# Patient Record
Sex: FEMALE | Race: WHITE | NOT HISPANIC OR LATINO | Employment: FULL TIME | URBAN - METROPOLITAN AREA
[De-identification: names, ages, dates, MRNs, and addresses within clinical notes are randomized per-mention and may not be internally consistent; named-entity substitution may affect disease eponyms.]

---

## 2017-07-28 ENCOUNTER — APPOINTMENT (OUTPATIENT)
Dept: WOMENS IMAGING | Facility: HOSPITAL | Age: 47
End: 2017-07-28

## 2017-07-28 PROCEDURE — 77067 SCR MAMMO BI INCL CAD: CPT | Performed by: RADIOLOGY

## 2017-08-30 ENCOUNTER — APPOINTMENT (OUTPATIENT)
Dept: WOMENS IMAGING | Facility: HOSPITAL | Age: 47
End: 2017-08-30

## 2017-08-30 PROCEDURE — 76641 ULTRASOUND BREAST COMPLETE: CPT | Performed by: RADIOLOGY

## 2017-08-30 PROCEDURE — G0204 DX MAMMO INCL CAD BI: HCPCS | Performed by: RADIOLOGY

## 2017-08-30 PROCEDURE — 77066 DX MAMMO INCL CAD BI: CPT | Performed by: RADIOLOGY

## 2018-02-21 ENCOUNTER — APPOINTMENT (OUTPATIENT)
Dept: WOMENS IMAGING | Facility: HOSPITAL | Age: 48
End: 2018-02-21

## 2018-02-21 PROCEDURE — 77061 BREAST TOMOSYNTHESIS UNI: CPT | Performed by: RADIOLOGY

## 2018-02-21 PROCEDURE — 77065 DX MAMMO INCL CAD UNI: CPT | Performed by: RADIOLOGY

## 2018-02-21 PROCEDURE — G0279 TOMOSYNTHESIS, MAMMO: HCPCS | Performed by: RADIOLOGY

## 2019-01-04 ENCOUNTER — OFFICE VISIT CONVERTED (OUTPATIENT)
Dept: FAMILY MEDICINE CLINIC | Facility: CLINIC | Age: 49
End: 2019-01-04
Attending: PHYSICIAN ASSISTANT

## 2019-01-31 ENCOUNTER — HOSPITAL ENCOUNTER (OUTPATIENT)
Dept: LAB | Facility: HOSPITAL | Age: 49
Discharge: HOME | End: 2019-01-31
Attending: PHYSICIAN ASSISTANT

## 2019-01-31 LAB
ALBUMIN SERPL-MCNC: 4.5 G/DL (ref 3.5–5)
ALBUMIN/GLOB SERPL: 1.3 {RATIO} (ref 1.4–2.6)
ALP SERPL-CCNC: 78 U/L (ref 42–98)
ALT SERPL-CCNC: 15 U/L (ref 10–40)
ANION GAP SERPL CALC-SCNC: 17 MMOL/L (ref 8–19)
AST SERPL-CCNC: 19 U/L (ref 15–50)
BASOPHILS # BLD AUTO: 0.07 10*3/UL (ref 0–0.2)
BASOPHILS NFR BLD AUTO: 0.88 % (ref 0–3)
BILIRUB SERPL-MCNC: 0.41 MG/DL (ref 0.2–1.3)
BUN SERPL-MCNC: 14 MG/DL (ref 5–25)
BUN/CREAT SERPL: 13 {RATIO} (ref 6–20)
CALCIUM SERPL-MCNC: 9.6 MG/DL (ref 8.7–10.4)
CHLORIDE SERPL-SCNC: 102 MMOL/L (ref 99–111)
CHOLEST SERPL-MCNC: 189 MG/DL (ref 107–200)
CHOLEST/HDLC SERPL: 4.2 {RATIO} (ref 3–6)
CONV CO2: 25 MMOL/L (ref 22–32)
CONV TOTAL PROTEIN: 7.9 G/DL (ref 6.3–8.2)
CREAT UR-MCNC: 1.04 MG/DL (ref 0.5–0.9)
EOSINOPHIL # BLD AUTO: 0.21 10*3/UL (ref 0–0.7)
EOSINOPHIL # BLD AUTO: 2.75 % (ref 0–7)
ERYTHROCYTE [DISTWIDTH] IN BLOOD BY AUTOMATED COUNT: 11.7 % (ref 11.5–14.5)
GFR SERPLBLD BASED ON 1.73 SQ M-ARVRAT: >60 ML/MIN/{1.73_M2}
GLOBULIN UR ELPH-MCNC: 3.4 G/DL (ref 2–3.5)
GLUCOSE SERPL-MCNC: 90 MG/DL (ref 65–99)
HBA1C MFR BLD: 14.3 G/DL (ref 12–16)
HCT VFR BLD AUTO: 41.1 % (ref 37–47)
HDLC SERPL-MCNC: 45 MG/DL (ref 40–60)
LDLC SERPL CALC-MCNC: 124 MG/DL (ref 70–100)
LYMPHOCYTES # BLD AUTO: 2.9 10*3/UL (ref 1–5)
MCH RBC QN AUTO: 30.2 PG (ref 27–31)
MCHC RBC AUTO-ENTMCNC: 34.7 G/DL (ref 33–37)
MCV RBC AUTO: 87 FL (ref 81–99)
MONOCYTES # BLD AUTO: 0.28 10*3/UL (ref 0.2–1.2)
MONOCYTES NFR BLD AUTO: 3.68 % (ref 3–10)
NEUTROPHILS # BLD AUTO: 4.07 10*3/UL (ref 2–8)
NEUTROPHILS NFR BLD AUTO: 54.1 % (ref 30–85)
NRBC BLD AUTO-RTO: 0 % (ref 0–0.01)
OSMOLALITY SERPL CALC.SUM OF ELEC: 290 MOSM/KG (ref 273–304)
PLATELET # BLD AUTO: 335 10*3/UL (ref 130–400)
PMV BLD AUTO: 7.4 FL (ref 7.4–10.4)
POTASSIUM SERPL-SCNC: 4.4 MMOL/L (ref 3.5–5.3)
RBC # BLD AUTO: 4.73 10*6/UL (ref 4.2–5.4)
SODIUM SERPL-SCNC: 140 MMOL/L (ref 135–147)
T4 FREE SERPL-MCNC: 1.1 NG/DL (ref 0.9–1.8)
TRIGL SERPL-MCNC: 98 MG/DL (ref 40–150)
TSH SERPL-ACNC: 2.96 M[IU]/L (ref 0.27–4.2)
VARIANT LYMPHS NFR BLD MANUAL: 38.6 % (ref 20–45)
VLDLC SERPL-MCNC: 20 MG/DL (ref 5–37)
WBC # BLD AUTO: 7.52 10*3/UL (ref 4.8–10.8)

## 2020-07-22 ENCOUNTER — HOSPITAL ENCOUNTER (OUTPATIENT)
Dept: URGENT CARE | Facility: CLINIC | Age: 50
Discharge: HOME | End: 2020-07-22
Attending: PHYSICIAN ASSISTANT

## 2020-07-23 LAB — SARS-COV-2 RNA SPEC QL NAA+PROBE: NOT DETECTED

## 2021-01-25 ENCOUNTER — HOSPITAL ENCOUNTER (OUTPATIENT)
Dept: MAMMOGRAPHY | Facility: HOSPITAL | Age: 51
Discharge: HOME | End: 2021-01-25

## 2021-05-16 VITALS
OXYGEN SATURATION: 99 % | BODY MASS INDEX: 32.15 KG/M2 | SYSTOLIC BLOOD PRESSURE: 100 MMHG | WEIGHT: 193 LBS | HEIGHT: 65 IN | HEART RATE: 97 BPM | DIASTOLIC BLOOD PRESSURE: 70 MMHG

## 2021-05-17 ENCOUNTER — OFFICE VISIT CONVERTED (OUTPATIENT)
Dept: ORTHOPEDIC SURGERY | Facility: CLINIC | Age: 51
End: 2021-05-17
Attending: PHYSICIAN ASSISTANT

## 2021-06-05 NOTE — H&P
"   History and Physical      Patient Name: Ronna Bajwa   Patient ID: [id number]   Sex: Female   YOB: 1970    Primary Care Provider: Miles Nuñez PA-C   Referring Provider: Miles Nuñez PA-C    Visit Date: May 17, 2021    Provider: Shona Quintana PA-C   Location: [facility]   Location Address: [address] [place] [address]  [id number]   Location Phone: [phone]          Chief Complaint  · Left knee pain      History Of Present Illness  Ronna Bajwa is a 50 year old /White female who presents today to [facility]. Patient presents today for left knee. Patient states that over the past few weeks, she has had pain behind her knee and on the inside of her knee. She denies any recent falls or injuries. She has been taking Aleeve for her pain, with no relief.       Past Surgical History  C-sections; Lap Band Surgery; Tubal ligation         Allergy List  NO KNOWN DRUG ALLERGIES       Allergies Reconciled  Family Medical History  Diabetes, unspecified type         Social History  Denies alcohol consumption; Tobacco (Never)         Review of Systems  · Constitutional  o Denies  o : fever, chills, weight loss  · Cardiovascular  o Denies  o : chest pain, shortness of breath  · Gastrointestinal  o Denies  o : liver disease, heartburn, nausea, blood in stools  · Genitourinary  o Denies  o : painful urination, blood in urine  · Integument  o Denies  o : rash, itching  · Neurologic  o Denies  o : headache, weakness, loss of consciousness  · Musculoskeletal  o Denies  o : painful, swollen joints  · Psychiatric  o Denies  o : drug/alcohol addiction, anxiety, depression      Vitals  Date Time BP Position Site L\R Cuff Size HR RR TEMP (F) WT  HT  BMI kg/m2 BSA m2 O2 Sat FR L/min FiO2 HC       05/17/2021 03:59 PM         190lbs 0oz 5'  5\" 31.62 1.99             Physical Examination  · Constitutional  o Appearance  o : well developed, well-nourished, no obvious " deformities present  · Head and Face  o Head  o :   § Inspection  § : normocephalic  o Face  o :   § Inspection  § : no facial lesions  · Eyes  o Conjunctivae  o : conjunctivae normal  o Sclerae  o : sclerae white  · Ears, Nose, Mouth and Throat  o Ears  o :   § External Ears  § : appearance within normal limits  § Hearing  § : intact  o Nose  o :   § External Nose  § : appearance normal  · Neck  o Inspection/Palpation  o : normal appearance  o Range of Motion  o : full range of motion  · Respiratory  o Respiratory Effort  o : breathing unlabored  o Inspection of Chest  o : normal appearance  o Auscultation of Lungs  o : no audible wheezing or rales  · Cardiovascular  o Heart  o : regular rate  · Gastrointestinal  o Abdominal Examination  o : soft and non-tender  · Skin and Subcutaneous Tissue  o General Inspection  o : intact, no rashes  · Psychiatric  o General  o : Alert and oriented x3  o Judgement and Insight  o : judgment and insight intact  o Mood and Affect  o : mood normal, affect appropriate  · Left Knee  o Inspection  o : Tenderness of the hamstring tendons. Tenderness of the medial joint line. No atrophy, swelling, discoloration or deformity. Full AROM of the knee. Muscle strength is 5/5 of the quadriceps and hamstrings. Normal plantar and dorsiflexion. Negative McMurrays. Negative Lachmans. Sensation is intact. N/v intact. Calf supple, nontender.  · Injection Note/Aspiration Note  o Site  o : left knee  o Procedure  o : Procedure: After educating the patient, patient gave consent for procedure. After using Chloraprep, the joint space was injected. The patient tolerated the procedure well.  o Medication  o : 80 mg of DepoMedrol with 9cc of 1% Lidocaine  · In Office Procedures  o View  o : LAT/SUNRISE/STANDING  o Site  o : left knee  o Indication  o : left knee pain  o Study  o : X-rays ordered, taken in the office, and reviewed today.  o Xray  o : Normal left knee x-ray  o Comparative Data  o : No  comparative data found          Assessment  · Left knee pain, unspecified chronicity     719.46/M25.562      Plan  · Orders  o Depo-Medrol injection 80mg () - - 05/17/2021   Lot [id number] Exp 10 01 2021 [employer] Administered by SAL gilbert Knee Intra-articular Injection without US Guidance [facility] ([id number]) - - 05/17/2021   Lot [id number] Exp 12 01 2022 [employer] Administered by SAL QUINTANA PA-C  o Knee (Left) [facility] Preferred View ([id number]) - 719.46/M25.562 - 05/17/2021  · Instructions  o Reviewed the patient's Past Medical, Social, and Family history as well as the ROS at today's visit, no changes.  o Call or return if worsening symptoms.  o Exercise handout given.  o X-ray ordered, taken and reviewed at this visit.  o Follow Up PRN.            Electronically Signed by: Sal Quintana PA-C -Author on May 17, 2021 05:11:28 PM

## 2021-07-13 ENCOUNTER — APPOINTMENT (OUTPATIENT)
Dept: CT IMAGING | Facility: HOSPITAL | Age: 51
End: 2021-07-13

## 2021-07-13 ENCOUNTER — HOSPITAL ENCOUNTER (EMERGENCY)
Facility: HOSPITAL | Age: 51
Discharge: HOME | End: 2021-07-13
Attending: EMERGENCY MEDICINE | Admitting: EMERGENCY MEDICINE

## 2021-07-13 ENCOUNTER — APPOINTMENT (OUTPATIENT)
Dept: GENERAL RADIOLOGY | Facility: HOSPITAL | Age: 51
End: 2021-07-13

## 2021-07-13 VITALS
WEIGHT: 184.75 LBS | SYSTOLIC BLOOD PRESSURE: 122 MMHG | HEART RATE: 76 BPM | RESPIRATION RATE: 18 BRPM | HEIGHT: 65 IN | OXYGEN SATURATION: 100 % | BODY MASS INDEX: 30.78 KG/M2 | DIASTOLIC BLOOD PRESSURE: 71 MMHG | TEMPERATURE: 97.5 F

## 2021-07-13 DIAGNOSIS — S09.90XA CLOSED HEAD INJURY, INITIAL ENCOUNTER: Primary | ICD-10-CM

## 2021-07-13 DIAGNOSIS — S00.01XA ABRASION OF SCALP, INITIAL ENCOUNTER: ICD-10-CM
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
## 2021-07-13 DIAGNOSIS — M25.511 ACUTE PAIN OF RIGHT SHOULDER: ICD-10-CM

## 2021-07-13 DIAGNOSIS — M54.6 ACUTE MIDLINE THORACIC BACK PAIN: ICD-10-CM

## 2021-07-13 PROCEDURE — 73030 X-RAY EXAM OF SHOULDER: CPT | Performed by: RADIOLOGY

## 2021-07-13 PROCEDURE — 99283 EMERGENCY DEPT VISIT LOW MDM: CPT

## 2021-07-13 PROCEDURE — 72072 X-RAY EXAM THORAC SPINE 3VWS: CPT | Performed by: RADIOLOGY

## 2021-07-13 PROCEDURE — 72072 X-RAY EXAM THORAC SPINE 3VWS: CPT

## 2021-07-13 PROCEDURE — 70450 CT HEAD/BRAIN W/O DYE: CPT

## 2021-07-13 PROCEDURE — 90471 IMMUNIZATION ADMIN: CPT | Performed by: NURSE PRACTITIONER

## 2021-07-13 PROCEDURE — 73030 X-RAY EXAM OF SHOULDER: CPT

## 2021-07-13 PROCEDURE — 25010000002 TDAP 5-2.5-18.5 LF-MCG/0.5 SUSPENSION: Performed by: NURSE PRACTITIONER

## 2021-07-13 PROCEDURE — 63710000001 ONDANSETRON ODT 4 MG TABLET DISPERSIBLE: Performed by: NURSE PRACTITIONER

## 2021-07-13 PROCEDURE — 90715 TDAP VACCINE 7 YRS/> IM: CPT | Performed by: NURSE PRACTITIONER

## 2021-07-13 RX ORDER — CYCLOBENZAPRINE HCL 10 MG
10 TABLET ORAL 3 TIMES DAILY PRN
Qty: 20 TABLET | Refills: 0 | Status: SHIPPED | OUTPATIENT
Start: 2021-07-13 | End: 2021-12-22

## 2021-07-13 RX ORDER — ONDANSETRON 4 MG/1
4 TABLET, ORALLY DISINTEGRATING ORAL ONCE
Status: COMPLETED | OUTPATIENT
Start: 2021-07-13 | End: 2021-07-13

## 2021-07-13 RX ADMIN — TETANUS TOXOID, REDUCED DIPHTHERIA TOXOID AND ACELLULAR PERTUSSIS VACCINE, ADSORBED 0.5 ML: 5; 2.5; 8; 8; 2.5 SUSPENSION INTRAMUSCULAR at 09:26

## 2021-07-13 RX ADMIN — ONDANSETRON 4 MG: 4 TABLET, ORALLY DISINTEGRATING ORAL at 09:25

## 2021-07-13 NOTE — ED PROVIDER NOTES
Subjective   Pt is 49 yo WF presenting to ED complaining of headache, right shoulder pain, and mid-upper back pain s/p fall immediately PTA. Pt states she was walking her dog and it pulled away from her causing her to fall backwards hitting her head. She denies LOC and is not on anticoagulants.           Review of Systems   Constitutional: Negative for activity change, chills, diaphoresis, fatigue and fever.   HENT: Negative for ear pain, rhinorrhea and sore throat.    Eyes: Negative for visual disturbance.   Respiratory: Negative for cough and shortness of breath.    Cardiovascular: Negative for chest pain.   Gastrointestinal: Positive for nausea. Negative for abdominal pain, diarrhea and vomiting.   Genitourinary: Negative for difficulty urinating.   Musculoskeletal: Positive for arthralgias (right shoulder) and back pain. Negative for myalgias.   Skin: Negative for rash.   Neurological: Positive for headaches. Negative for dizziness, syncope and light-headedness.   Hematological: Negative for adenopathy. Does not bruise/bleed easily.   Psychiatric/Behavioral: Negative.  Negative for agitation, behavioral problems and confusion.       No past medical history on file.    No Known Allergies    No past surgical history on file.    No family history on file.    Social History     Socioeconomic History   • Marital status:      Spouse name: Not on file   • Number of children: Not on file   • Years of education: Not on file   • Highest education level: Not on file           Objective   Physical Exam  Vitals and nursing note reviewed.   Constitutional:       Appearance: Normal appearance. She is normal weight.   HENT:      Head: Normocephalic.        Right Ear: Tympanic membrane normal.      Left Ear: Tympanic membrane normal.      Nose: Nose normal.      Mouth/Throat:      Mouth: Mucous membranes are moist.   Eyes:      Extraocular Movements: Extraocular movements intact.      Conjunctiva/sclera: Conjunctivae  normal.      Pupils: Pupils are equal, round, and reactive to light.   Cardiovascular:      Rate and Rhythm: Normal rate and regular rhythm.   Pulmonary:      Effort: Pulmonary effort is normal.      Breath sounds: Normal breath sounds.   Abdominal:      General: Abdomen is flat.      Palpations: Abdomen is soft.      Tenderness: There is no abdominal tenderness.   Musculoskeletal:         General: Tenderness (right shoulder) present.      Cervical back: Normal range of motion and neck supple.      Thoracic back: Tenderness and bony tenderness present.   Neurological:      Mental Status: She is alert.         Procedures           ED Course      Medications   Tdap (BOOSTRIX) injection 0.5 mL (0.5 mL Intramuscular Given 7/13/21 0926)   ondansetron ODT (ZOFRAN-ODT) disintegrating tablet 4 mg (4 mg Oral Given 7/13/21 0925)        XR Spine Thoracic 3 View    Result Date: 7/13/2021  Narrative: PROCEDURE: XR SPINE THORACIC 3 VW  COMPARISON: None  INDICATIONS: MID BACK PAIN, RIGHT SHOULDER PAIN, FALL  FINDINGS:  Vertebral body heights and disc spaces are maintained.  No fractures or subluxations are seen.  Mild degenerative spurring is evident.  No lytic or sclerotic bone lesions are evident.  A lap band device is in place.  CONCLUSION:  Negative thoracic spine series with swimmer's lateral view.     NATALIIA LIZARRAGA MD       Electronically Signed and Approved By: NATALIIA LIZARRAGA MD on 7/13/2021 at 9:41             XR Shoulder 2+ View Right    Result Date: 7/13/2021  Narrative: PROCEDURE: XR SHOULDER 2+ VW RIGHT  COMPARISON: None  INDICATIONS: MID BACK PAIN, RIGHT SHOULDER PAIN, FALL  FINDINGS:  No fractures are visualized.  There are no findings to suggest dislocation.  Mild degenerative change consistent with osteoarthritis is seen in the glenohumeral joint.  Mild AC arthrosis is evident.  No abnormality seen at the right lung apex.  CONCLUSION:  Right shoulder series demonstrating mild degenerative change consistent with  osteoarthritis.      NATALIIA LIZARRAGA MD       Electronically Signed and Approved By: NATALIIA LIZARRAGA MD on 7/13/2021 at 9:42             CT Head Without Contrast    Result Date: 7/13/2021  Narrative: PROCEDURE: CT HEAD WO CONTRAST  COMPARISON:  None INDICATIONS: head trauma, fell and hit back of head on concrete, posterior scalp laceration, headache, dizziness, nausea  PROTOCOL:   Standard imaging protocol performed    RADIATION:   DLP: 953.9mGy*cm   MA and/or KV was adjusted to minimize radiation dose.     TECHNIQUE: After obtaining the patient's consent, CT images were obtained without non-ionic intravenous contrast material.  FINDINGS:  CEREBRUM: No edema, hemorrhage, mass, acute infarction, or inappropriate atrophy.  CEREBELLUM: No edema, hemorrhage, mass, acute infarction, or inappropriate atrophy.  BRAINSTEM: No edema, hemorrhage, mass, acute infarction, or inappropriate atrophy.  CSF SPACES: Ventricles, cisterns, and sulci are appropriate for age.  No hydrocephalus, subarachnoid hemorrhage, or mass.  SKULL: No mass or other significant visible lesion.  SINUSES: Limited views demonstrate no significant mucosal thickening or fluid.  ORBITS: Limited views are unremarkable.   CONCLUSION: No acute disease.    LAURA LOGAN MD       Electronically Signed and Approved By: LAURA LOGAN MD on 7/13/2021 at 9:14                                     1018: Discussed ED findings with pt/family and plan for discharge. They verbalized understanding and agree with plan.        MDM    Final diagnoses:   Closed head injury, initial encounter   Abrasion of scalp, initial encounter   Acute pain of right shoulder   Acute midline thoracic back pain   Accidental fall, initial encounter       ED Disposition  ED Disposition     ED Disposition Condition Comment    Discharge Stable           Emir Nuñez PA  [address]  [address]  [place] [address]  [phone]    In 2 days           Medication List      New Prescriptions     cyclobenzaprine 10 MG tablet  Commonly known as: FLEXERIL  Take 1 tablet by mouth 3 (Three) Times a Day As Needed for Muscle Spasms.           Where to Get Your Medications      These medications were sent to [facility]pharmacy #[id number] - [place], [place] - [address] - [phone] [facility] [phone] FX  [address] [place] [address]    Hours: 24-hours Phone: [phone]   · cyclobenzaprine 10 MG tablet          Peggy Randhawa APRN  07/13/21 5494

## 2021-07-13 NOTE — ED NOTES
Pt reports fall from standing while brining dog back inside. Pt reports falling backwards and hitting back of head on landing step.  - LOC, bleeding controlled at this time.   Denies use of anticoagulants. Denies numbness/tingling in extremities. A&Ox4, resp evn and NL, NAD, skin WPD.  PEARRL.  approx 4cm superficial laceration noted to the back of head.  Edges approximate and bleeding is controlled.  Pt also reports L rib pain; area of redness noted.      Edith Keller, RN  07/13/21 5069

## 2021-07-13 NOTE — DISCHARGE INSTRUCTIONS
You can take Ibuprofen/Tylenol for pain.  Take Flexeril for pain/muscle spasms.  You may be sore for several days.  Return to ED immediately for severe headache, altered mental status, vision changes, vomiting, or any other concerns.

## 2021-07-15 VITALS — WEIGHT: 190 LBS | BODY MASS INDEX: 31.65 KG/M2 | HEIGHT: 65 IN

## 2021-08-09 PROCEDURE — U0003 INFECTIOUS AGENT DETECTION BY NUCLEIC ACID (DNA OR RNA); SEVERE ACUTE RESPIRATORY SYNDROME CORONAVIRUS 2 (SARS-COV-2) (CORONAVIRUS DISEASE [COVID-19]), AMPLIFIED PROBE TECHNIQUE, MAKING USE OF HIGH THROUGHPUT TECHNOLOGIES AS DESCRIBED BY CMS-2020-01-R: HCPCS | Performed by: NURSE PRACTITIONER

## 2021-08-10 ENCOUNTER — TELEPHONE (OUTPATIENT)
Dept: URGENT CARE | Facility: CLINIC | Age: 51
End: 2021-08-10

## 2021-10-25 PROBLEM — M79.89 LIMB SWELLING: Status: ACTIVE | Noted: 2021-10-25

## 2021-10-25 PROBLEM — D64.9 ANEMIA: Status: ACTIVE | Noted: 2021-10-25

## 2021-10-26 ENCOUNTER — OFFICE VISIT (OUTPATIENT)
Dept: FAMILY MEDICINE CLINIC | Facility: CLINIC | Age: 51
End: 2021-10-26

## 2021-10-26 VITALS
BODY MASS INDEX: 31.32 KG/M2 | OXYGEN SATURATION: 100 % | DIASTOLIC BLOOD PRESSURE: 57 MMHG | WEIGHT: 188 LBS | HEIGHT: 65 IN | SYSTOLIC BLOOD PRESSURE: 104 MMHG | HEART RATE: 76 BPM

## 2021-10-26 DIAGNOSIS — R19.7 DIARRHEA, UNSPECIFIED TYPE: Primary | ICD-10-CM

## 2021-10-26 DIAGNOSIS — K59.00 CONSTIPATION, UNSPECIFIED CONSTIPATION TYPE: ICD-10-CM

## 2021-10-26 PROCEDURE — 99213 OFFICE O/P EST LOW 20 MIN: CPT | Performed by: NURSE PRACTITIONER

## 2021-10-26 NOTE — PROGRESS NOTES
Chief Complaint  Diarrhea and Constipation    Subjective            Ronna Bajwa presents to [facility]  Pt has c/o diarrhea and constipation issues. Pt states this stems when she eats. Pt will have bloating after eating, diarrhea. After a few days constipation will occur. Pt states any food or drink will trigger this.     PT states she had a lapband in  it was removed in  due to complications and then she had an Ilap put in in  by Dr. Encarnacion.  She has had problems since with her GI system.  She denies pain but constantly has bloating and loose stools and constipation after she eats anything.    She had a colonosocpy about 5-7 years ago.        PMH  History reviewed. No pertinent past medical history.    ALLERGY  No Known Allergies     SURGICALHX  Past Surgical History:   Procedure Laterality Date   •  SECTION      x2   • LAPAROSCOPIC GASTRIC BANDING      and removal, and replacement        SOCX  Social History     Tobacco Use   • Smoking status: Never Smoker   • Smokeless tobacco: Never Used   Substance Use Topics   • Alcohol use: Never       FAMHX  Family History   Problem Relation Age of Onset   • Diabetes Mother    • Heart disease Mother    • Alzheimer's disease Father    • Parkinsonism Father    • Colon cancer Father    • Heart disease Father    • Cancer Sister    • Heart disease Sister         MEDSIGONLY  Current Outpatient Medications on File Prior to Visit   Medication Sig   • cyclobenzaprine (FLEXERIL) 10 MG tablet Take 1 tablet by mouth 3 (Three) Times a Day As Needed for Muscle Spasms.     No current facility-administered medications on file prior to visit.       Health Maintenance Due   Topic Date Due   • COLORECTAL CANCER SCREENING  Never done   • ANNUAL PHYSICAL  Never done   • ZOSTER VACCINE (1 of 2) Never done   • INFLUENZA VACCINE  2021   • HEPATITIS C SCREENING  Never done   • PAP SMEAR  Never done       Objective     /57   " Pulse 76   Ht 165.1 cm (65\")   Wt 85.3 kg (188 lb)   SpO2 100%   BMI 31.28 kg/m²       Physical Exam  Constitutional:       General: She is not in acute distress.     Appearance: Normal appearance. She is not ill-appearing.   HENT:      Head: Normocephalic and atraumatic.      Mouth/Throat:      Pharynx: No oropharyngeal exudate or posterior oropharyngeal erythema.   Cardiovascular:      Rate and Rhythm: Normal rate and regular rhythm.      Heart sounds: Normal heart sounds. No murmur heard.      Pulmonary:      Effort: Pulmonary effort is normal. No respiratory distress.      Breath sounds: Normal breath sounds.   Chest:      Chest wall: No tenderness.   Abdominal:      General: Abdomen is flat. Bowel sounds are normal. There is no distension.      Palpations: Abdomen is soft. There is no mass.      Tenderness: There is no abdominal tenderness. There is no guarding.   Musculoskeletal:         General: No swelling or tenderness. Normal range of motion.      Cervical back: Normal range of motion and neck supple.   Skin:     General: Skin is warm and dry.      Findings: No rash.   Neurological:      General: No focal deficit present.      Mental Status: She is alert and oriented to person, place, and time. Mental status is at baseline.      Gait: Gait normal.   Psychiatric:         Mood and Affect: Mood normal.         Behavior: Behavior normal.         Thought Content: Thought content normal.         Judgment: Judgment normal.           Result Review :                           Assessment and Plan        Diagnoses and all orders for this visit:    1. Diarrhea, unspecified type (Primary)  -     Ambulatory Referral to Gastroenterology    2. Constipation, unspecified constipation type  -     Ambulatory Referral to Gastroenterology              Follow Up     Return if symptoms worsen or fail to improve.    Patient was given instructions and counseling regarding her condition or for health maintenance advice. Please " see specific information pulled into the AVS if appropriate.     Ronna DUNAWAY Aydee  reports that she has never smoked. She has never used smokeless tobacco..

## 2021-11-02 ENCOUNTER — TELEPHONE (OUTPATIENT)
Dept: GASTROENTEROLOGY | Facility: CLINIC | Age: 51
End: 2021-11-02

## 2021-11-02 ENCOUNTER — OFFICE VISIT (OUTPATIENT)
Dept: GASTROENTEROLOGY | Facility: CLINIC | Age: 51
End: 2021-11-02

## 2021-11-02 VITALS — WEIGHT: 184.8 LBS | TEMPERATURE: 96.8 F | HEIGHT: 65 IN | BODY MASS INDEX: 30.79 KG/M2

## 2021-11-02 DIAGNOSIS — Z80.0 FAMILY HISTORY OF COLON CANCER IN FATHER: ICD-10-CM

## 2021-11-02 DIAGNOSIS — Z83.71 FAMILY HISTORY OF POLYPS IN THE COLON: ICD-10-CM

## 2021-11-02 DIAGNOSIS — R19.4 CHANGE IN BOWEL HABITS: Primary | ICD-10-CM

## 2021-11-02 PROBLEM — Z83.719 FAMILY HISTORY OF POLYPS IN THE COLON: Status: ACTIVE | Noted: 2021-11-02

## 2021-11-02 PROCEDURE — 99214 OFFICE O/P EST MOD 30 MIN: CPT | Performed by: NURSE PRACTITIONER

## 2021-11-02 RX ORDER — L.RHAMNOSUS/B.ANIMALIS(LACTIS) 3B CELL
1 CAPSULE ORAL DAILY
Qty: 30 CAPSULE | Refills: 5 | Status: SHIPPED | OUTPATIENT
Start: 2021-11-02 | End: 2021-12-22

## 2021-11-02 NOTE — TELEPHONE ENCOUNTER
BRETT Palmer for colonoscopy on 12/23  arrive at 2pm   . Gave Prep instructions in office. ----miralax      Advised PT  that  will call with final arrival time  24 hrs before procedure. If they do not get a phone call, arrival time will stay the same as given on instructions

## 2021-11-02 NOTE — PROGRESS NOTES
Chief Complaint   Patient presents with   • Gas   • Alternating constipation and diarrhea       HPI    Ronna Bajwa is a  51 y.o. female here to establish care as a new patient for complaints of change in bowel habits with gas and bloating.    This patient will also follow with Dr. Gresham.    Normal colonoscopy per patient in .  She also has a history of gastric banding  with removal  and replacement .  Normal EGD in .    Patient reports several months of change in bowel habits fluctuating between small volume stools and episodes of diarrhea.  Bowels move daily to every other day.  With diarrhea no more than 2 episodes in 1 day.  Frequent gas and bloating.  Symptoms mainly postprandial.  Tried a lactose-free with no relief.  No rectal bleeding or weight loss.  Current BMI 30.75.    Denies upper GI complaints.    History reviewed. No pertinent past medical history.    Past Surgical History:   Procedure Laterality Date   • ABDOMINAL SURGERY  ,    • BARIATRIC SURGERY  ,   •  SECTION      x2   • COLONOSCOPY  approx     negative per pt    • LAPAROSCOPIC GASTRIC BANDING      and removal, and replacement   • TUBAL ABDOMINAL LIGATION     • UPPER GASTROINTESTINAL ENDOSCOPY  approx     negative per pt        Scheduled Meds:     Continuous Infusions: No current facility-administered medications for this visit.      PRN Meds:     No Known Allergies    Social History     Socioeconomic History   • Marital status:    Tobacco Use   • Smoking status: Never Smoker   • Smokeless tobacco: Never Used   Substance and Sexual Activity   • Alcohol use: Never   • Drug use: Never       Family History   Problem Relation Age of Onset   • Diabetes Mother    • Heart disease Mother    • Alzheimer's disease Father    • Parkinsonism Father    • Colon cancer Father    • Heart disease Father    • Colon polyps Father    • Cancer Sister    • Heart disease Sister        Review of  Systems   Constitutional: Negative for activity change, appetite change, fatigue and unexpected weight change.   HENT: Negative for trouble swallowing.    Eyes: Negative.    Respiratory: Negative.    Cardiovascular: Negative.    Gastrointestinal: Negative for abdominal distention, abdominal pain, anal bleeding, blood in stool, constipation, diarrhea, nausea, rectal pain and vomiting.        + Change in bowel habits   Endocrine: Negative.    Genitourinary: Negative.    Musculoskeletal: Negative.    Allergic/Immunologic: Negative.    Neurological: Negative.    Hematological: Negative.    Psychiatric/Behavioral: Negative.        Vitals:    11/02/21 1421   Temp: 96.8 °F (36 °C)       Physical Exam  Constitutional:       Appearance: She is well-developed.   Abdominal:      General: Bowel sounds are normal. There is no distension.      Palpations: Abdomen is soft. There is no mass.      Tenderness: There is no abdominal tenderness. There is no guarding.      Hernia: No hernia is present.   Skin:     General: Skin is warm and dry.      Capillary Refill: Capillary refill takes less than 2 seconds.   Neurological:      Mental Status: She is alert and oriented to person, place, and time.   Psychiatric:         Behavior: Behavior normal.     Assessment    Diagnoses and all orders for this visit:    1. Change in bowel habits (Primary)  Overview:  Added automatically from request for surgery [id number]    Orders:  -     Case Request; Standing  -     Case Request    2. Family history of colon cancer in father  Overview:  Added automatically from request for surgery [id number]    Orders:  -     Case Request; Standing  -     Case Request    3. Family history of polyps in the colon  Overview:  Added automatically from request for surgery [id number]    Orders:  -     Case Request; Standing  -     Case Request    Other orders  -     Probiotic Product ([employer]) capsule; Take 1 tablet by mouth Daily.  Dispense: 30 capsule; Refill:  5     Plan    Very pleasant 51-year-old female seen today for change in bowel habits with a family history of colon cancer and colon polyps in her father and paternal aunt.     Recommend colonoscopy with Dr. Gresham for further evaluation of symptoms rule out underlying abnormalities it could be contributing to change in bowel habits such as colonic inflammation, polyp, or mass/malignancy.     Risk/benefits of procedure reviewed with the patient.   Offered additional work-up to include food allergy panel, TSH, and celiac comprehensive panel but patient declines lab work on visit today prefers to move forward with colonoscopy only.     Discussed the benefits of [place] colon health probiotics, healthy eating habits, adequate hydration, and increase physical activity to promote motility.     Follow-up and further recommendations pending the aforementioned work-up.         ASHLEY Garcia  [facility]  [address] [place] [address]  Office: [phone]

## 2021-12-22 RX ORDER — ACETAMINOPHEN 500 MG
1000 TABLET ORAL EVERY 6 HOURS PRN
COMMUNITY

## 2021-12-23 ENCOUNTER — ANESTHESIA (OUTPATIENT)
Dept: GASTROENTEROLOGY | Facility: HOSPITAL | Age: 51
End: 2021-12-23

## 2021-12-23 ENCOUNTER — HOSPITAL ENCOUNTER (OUTPATIENT)
Facility: HOSPITAL | Age: 51
Setting detail: HOSPITAL OUTPATIENT SURGERY
Discharge: HOME | End: 2021-12-23
Attending: INTERNAL MEDICINE | Admitting: INTERNAL MEDICINE

## 2021-12-23 ENCOUNTER — ANESTHESIA EVENT (OUTPATIENT)
Dept: GASTROENTEROLOGY | Facility: HOSPITAL | Age: 51
End: 2021-12-23

## 2021-12-23 VITALS
SYSTOLIC BLOOD PRESSURE: 107 MMHG | RESPIRATION RATE: 14 BRPM | HEIGHT: 65 IN | WEIGHT: 184.2 LBS | HEART RATE: 69 BPM | OXYGEN SATURATION: 100 % | BODY MASS INDEX: 30.69 KG/M2 | DIASTOLIC BLOOD PRESSURE: 82 MMHG

## 2021-12-23 DIAGNOSIS — R19.4 CHANGE IN BOWEL HABITS: ICD-10-CM

## 2021-12-23 DIAGNOSIS — Z83.71 FAMILY HISTORY OF POLYPS IN THE COLON: ICD-10-CM

## 2021-12-23 DIAGNOSIS — Z80.0 FAMILY HISTORY OF COLON CANCER IN FATHER: ICD-10-CM

## 2021-12-23 PROCEDURE — S0260 H&P FOR SURGERY: HCPCS | Performed by: INTERNAL MEDICINE

## 2021-12-23 PROCEDURE — 88305 TISSUE EXAM BY PATHOLOGIST: CPT | Performed by: INTERNAL MEDICINE

## 2021-12-23 PROCEDURE — 25010000002 PROPOFOL 10 MG/ML EMULSION: Performed by: STUDENT IN AN ORGANIZED HEALTH CARE EDUCATION/TRAINING PROGRAM

## 2021-12-23 PROCEDURE — 45380 COLONOSCOPY AND BIOPSY: CPT | Performed by: INTERNAL MEDICINE

## 2021-12-23 PROCEDURE — 45385 COLONOSCOPY W/LESION REMOVAL: CPT | Performed by: INTERNAL MEDICINE

## 2021-12-23 RX ORDER — SODIUM CHLORIDE 0.9 % (FLUSH) 0.9 %
3 SYRINGE (ML) INJECTION EVERY 12 HOURS SCHEDULED
Status: DISCONTINUED | OUTPATIENT
Start: 2021-12-23 | End: 2021-12-23 | Stop reason: HOSPADM

## 2021-12-23 RX ORDER — SODIUM CHLORIDE 0.9 % (FLUSH) 0.9 %
10 SYRINGE (ML) INJECTION AS NEEDED
Status: DISCONTINUED | OUTPATIENT
Start: 2021-12-23 | End: 2021-12-23 | Stop reason: HOSPADM

## 2021-12-23 RX ORDER — LIDOCAINE HYDROCHLORIDE 20 MG/ML
INJECTION, SOLUTION INFILTRATION; PERINEURAL AS NEEDED
Status: DISCONTINUED | OUTPATIENT
Start: 2021-12-23 | End: 2021-12-23 | Stop reason: SURG

## 2021-12-23 RX ORDER — SODIUM CHLORIDE, SODIUM LACTATE, POTASSIUM CHLORIDE, CALCIUM CHLORIDE 600; 310; 30; 20 MG/100ML; MG/100ML; MG/100ML; MG/100ML
30 INJECTION, SOLUTION INTRAVENOUS CONTINUOUS PRN
Status: DISCONTINUED | OUTPATIENT
Start: 2021-12-23 | End: 2021-12-23 | Stop reason: HOSPADM

## 2021-12-23 RX ORDER — PROPOFOL 10 MG/ML
VIAL (ML) INTRAVENOUS CONTINUOUS PRN
Status: DISCONTINUED | OUTPATIENT
Start: 2021-12-23 | End: 2021-12-23 | Stop reason: SURG

## 2021-12-23 RX ORDER — PROPOFOL 10 MG/ML
VIAL (ML) INTRAVENOUS AS NEEDED
Status: DISCONTINUED | OUTPATIENT
Start: 2021-12-23 | End: 2021-12-23 | Stop reason: SURG

## 2021-12-23 RX ADMIN — LIDOCAINE HYDROCHLORIDE 60 MG: 20 INJECTION, SOLUTION INFILTRATION; PERINEURAL at 13:43

## 2021-12-23 RX ADMIN — PROPOFOL 160 MCG/KG/MIN: 10 INJECTION, EMULSION INTRAVENOUS at 13:43

## 2021-12-23 RX ADMIN — Medication 40 MG: at 13:43

## 2021-12-23 RX ADMIN — Medication 40 MG: at 13:46

## 2021-12-23 RX ADMIN — SODIUM CHLORIDE, POTASSIUM CHLORIDE, SODIUM LACTATE AND CALCIUM CHLORIDE 30 ML/HR: 600; 310; 30; 20 INJECTION, SOLUTION INTRAVENOUS at 13:37

## 2021-12-23 NOTE — ANESTHESIA PREPROCEDURE EVALUATION
Anesthesia Evaluation     Patient summary reviewed and Nursing notes reviewed   NPO Solid Status: > 8 hours             Airway   Mallampati: II  TM distance: >3 FB  Neck ROM: full  no difficulty expected  Dental - normal exam     Pulmonary - normal exam   Cardiovascular - normal exam        Neuro/Psych  GI/Hepatic/Renal/Endo    (+) obesity,       Musculoskeletal     Abdominal  - normal exam   Substance History      OB/GYN          Other                        Anesthesia Plan    ASA 2     MAC     intravenous induction     Anesthetic plan, all risks, benefits, and alternatives have been provided, discussed and informed consent has been obtained with: patient.

## 2021-12-23 NOTE — ANESTHESIA POSTPROCEDURE EVALUATION
"Patient: Ronna Bajwa    Procedure Summary     Date: 12/23/21 Room / [facility]:  [facility]    Anesthesia Start: 1341 Anesthesia Stop: 1411    Procedure: COLONOSCOPY to cecum with biopsies with hot snare polypectomy (N/A ) Diagnosis:       Change in bowel habits      Family history of colon cancer in father      Family history of polyps in the colon      (Change in bowel habits [R19.4])      (Family history of colon cancer in father [Z80.0])      (Family history of polyps in the colon [Z83.71])    Surgeons: Abdelrahman Gresham MD Provider: Rajeev Santa MD    Anesthesia Type: MAC ASA Status: 2          Anesthesia Type: MAC    Vitals  Vitals Value Taken Time   BP 83/59 12/23/21 1408   Temp     Pulse 74 12/23/21 1408   Resp 14 12/23/21 1408   SpO2 97 % 12/23/21 1408           Post Anesthesia Care and Evaluation    Patient location during evaluation: bedside  Patient participation: complete - patient participated  Level of consciousness: awake and alert  Pain management: adequate  Airway patency: patent  Anesthetic complications: No anesthetic complications    Cardiovascular status: acceptable  Respiratory status: acceptable  Hydration status: acceptable    Comments: BP (!) 83/59 (BP Location: Left arm, Patient Position: Lying)   Pulse 74   Resp 14   Ht 165.1 cm (65\")   Wt 83.6 kg (184 lb 3.2 oz)   SpO2 97%   BMI 30.65 kg/m²       "

## 2021-12-23 NOTE — H&P
"[facility]  Pre Procedure History & Physical    Chief Complaint:   Time for my colonoscopy    Subjective     HPI:   51 y.o. female engine bowel habits, diarrhea, family history of colon cancer in her father    Past Medical History:   Past Medical History:   Diagnosis Date   • History of transfusion        Family History:  Family History   Problem Relation Age of Onset   • Diabetes Mother    • Heart disease Mother    • Alzheimer's disease Father    • Parkinsonism Father    • Colon cancer Father    • Heart disease Father    • Colon polyps Father    • Cancer Sister    • Heart disease Sister    • Malig Hyperthermia Neg Hx        Social History:   reports that she has never smoked. She has never used smokeless tobacco. She reports that she does not drink alcohol and does not use drugs.    Medications:   Medications Prior to Admission   Medication Sig Dispense Refill Last Dose   • acetaminophen (TYLENOL) 500 MG tablet Take 1,000 mg by mouth Every 6 (Six) Hours As Needed for Mild Pain .   Past Week at Unknown time       Allergies:  Patient has no known allergies.    ROS:    Pertinent items are noted in HPI     Objective     Blood pressure 125/84, pulse 72, resp. rate 18, height 165.1 cm (65\"), weight 83.6 kg (184 lb 3.2 oz), SpO2 99 %, not currently breastfeeding.    Physical Exam   Constitutional: Pt is oriented to person, place, and time and well-developed, well-nourished, and in no distress.   Abdominal: Soft.   Psychiatric: Mood, memory, affect and judgment normal.     Assessment/Plan     Diagnosis:  See above    Anticipated Surgical Procedure:  Colonoscopy    The risks, benefits, and alternatives of this procedure have been discussed with the patient or the responsible party- the patient understands and agrees to proceed.                                                                "

## 2021-12-24 LAB
LAB AP CASE REPORT: NORMAL
PATH REPORT.FINAL DX SPEC: NORMAL
PATH REPORT.GROSS SPEC: NORMAL

## 2021-12-24 NOTE — PROGRESS NOTES
Pathology shows tubular adenomaColonoscopy 3 yearsOffice visit Pilar 4 to 6 weeks to discuss possible changesShe can use IBgard as needed for the time being

## 2021-12-27 ENCOUNTER — TELEPHONE (OUTPATIENT)
Dept: GASTROENTEROLOGY | Facility: CLINIC | Age: 51
End: 2021-12-27

## 2021-12-27 NOTE — TELEPHONE ENCOUNTER
Called pt and advised of Dr. Gresham's note.  Pt verb understanding.  Pt scheduled f/u appt carmina/Pilar 01/26/22 @ 1400.  Pt added to 3 year c/s recall.

## 2021-12-27 NOTE — TELEPHONE ENCOUNTER
Smith Avalos'Ernestine, RegSchyue Rep  [facility] [address] [place]  Phone Number: [phone]     Pt is returning phone call. Please give pt a call back.

## 2021-12-27 NOTE — TELEPHONE ENCOUNTER
----- Message from Abdelrahman Gresham MD sent at 12/24/2021 12:52 PM EST -----  Pathology shows tubular adenomaColonoscopy 3 yearsOffice visit Pilar 4 to 6 weeks to discuss possible changesMarcellee can use IBgard as needed for the time being

## 2023-11-08 ENCOUNTER — HOSPITAL ENCOUNTER (OUTPATIENT)
Dept: GENERAL RADIOLOGY | Facility: HOSPITAL | Age: 53
Discharge: HOME | End: 2023-11-08
Payer: COMMERCIAL

## 2023-11-08 ENCOUNTER — OFFICE VISIT (OUTPATIENT)
Dept: FAMILY MEDICINE CLINIC | Facility: CLINIC | Age: 53
End: 2023-11-08
Payer: COMMERCIAL

## 2023-11-08 VITALS
HEIGHT: 65 IN | DIASTOLIC BLOOD PRESSURE: 73 MMHG | OXYGEN SATURATION: 100 % | HEART RATE: 79 BPM | WEIGHT: 192 LBS | BODY MASS INDEX: 31.99 KG/M2 | SYSTOLIC BLOOD PRESSURE: 109 MMHG

## 2023-11-08 DIAGNOSIS — E66.9 CLASS 1 OBESITY WITH BODY MASS INDEX (BMI) OF 31.0 TO 31.9 IN ADULT, UNSPECIFIED OBESITY TYPE, UNSPECIFIED WHETHER SERIOUS COMORBIDITY PRESENT: ICD-10-CM

## 2023-11-08 DIAGNOSIS — M54.2 NECK PAIN: ICD-10-CM

## 2023-11-08 DIAGNOSIS — M54.6 THORACIC BACK PAIN, UNSPECIFIED BACK PAIN LATERALITY, UNSPECIFIED CHRONICITY: ICD-10-CM

## 2023-11-08 DIAGNOSIS — Z13.29 THYROID DISORDER SCREEN: ICD-10-CM

## 2023-11-08 DIAGNOSIS — M51.34 DDD (DEGENERATIVE DISC DISEASE), THORACIC: ICD-10-CM

## 2023-11-08 DIAGNOSIS — Z13.220 LIPID SCREENING: ICD-10-CM

## 2023-11-08 DIAGNOSIS — Z00.00 ANNUAL PHYSICAL EXAM: Primary | ICD-10-CM

## 2023-11-08 DIAGNOSIS — M54.6 THORACIC BACK PAIN, UNSPECIFIED BACK PAIN LATERALITY, UNSPECIFIED CHRONICITY: Primary | ICD-10-CM

## 2023-11-08 PROCEDURE — 72072 X-RAY EXAM THORAC SPINE 3VWS: CPT

## 2023-11-08 PROCEDURE — 72040 X-RAY EXAM NECK SPINE 2-3 VW: CPT

## 2023-11-08 RX ORDER — PROGESTERONE 100 MG/1
100 CAPSULE ORAL
COMMUNITY
Start: 2023-10-30

## 2023-11-08 RX ORDER — ESTRADIOL 0.07 MG/D
1 FILM, EXTENDED RELEASE TRANSDERMAL 2 TIMES WEEKLY
COMMUNITY
Start: 2023-10-30

## 2023-11-08 RX ORDER — METHOCARBAMOL 500 MG/1
500 TABLET, FILM COATED ORAL 2 TIMES DAILY PRN
Qty: 30 TABLET | Refills: 0 | Status: SHIPPED | OUTPATIENT
Start: 2023-11-08

## 2023-11-08 NOTE — ASSESSMENT & PLAN NOTE
Patient's (Body mass index is 31.95 kg/m².) indicates that they are obese (BMI >30) with health conditions that include none . Weight is unchanged. BMI  is above average; BMI management plan is completed. We discussed portion control and increasing exercise.    Patient is to start a food journal, calculate daily calorie intake, increase exercise, and follow-up in 1 month for reevaluation.  We briefly discussed Saxenda and Wegovy,

## 2023-11-08 NOTE — PROGRESS NOTES
Chief Complaint  Annual exam, neck and back issues     SUBJECTIVE  Ronna Bajwa presents to [facility]     Pt here today to to establish care with new provider within the office, previous PCP was Emir Nuñez in 2019.    Mammogram and Pap Smear done yearly at [facility] in [place]. 2023.     Pt also c/o neck and back pain, states neck pain has been going on for a while, but the back pain is newer.  States it feels like an ache, no known injury, states starting to make it difficult to sleep at night, states she is very large chested and has a lot of back neck and shoulder issues, has considered breast reduction    Patient also wants to discuss weight issues, has previous bariatric surgery, lost about 100 pounds, but has gained some back, having difficulty losing it.      History of Present Illness  Past Medical History:   Diagnosis Date    History of transfusion       Family History   Problem Relation Age of Onset    Diabetes Mother     Heart disease Mother     Alzheimer's disease Father     Parkinsonism Father     Colon cancer Father     Heart disease Father     Colon polyps Father     Cancer Sister     Heart disease Sister     Cancer Maternal Grandmother     Malig Hyperthermia Neg Hx       Past Surgical History:   Procedure Laterality Date    ABDOMINAL SURGERY  ,     BARIATRIC SURGERY  ,     SECTION      x2    COLONOSCOPY  approx     negative per pt     COLONOSCOPY N/A 2021    Procedure: COLONOSCOPY to cecum with biopsies with hot snare polypectomy;  Surgeon: Abdelrahman Gresham MD;  Location: [facility] ENDOSCOPY;  Service: Gastroenterology;  Laterality: N/A;  Pre: family h/x colon cancer, change in bowel habits  Post: polyp, hemorrhoids    ENDOMETRIAL ABLATION      LAPAROSCOPIC GASTRIC BANDING      and removal, and replacement    LAPAROSCOPY HYSTEROSCOPY ENDOMETRIAL ABLATION      LASIK      TUBAL ABDOMINAL LIGATION      UPPER  "GASTROINTESTINAL ENDOSCOPY  approx 2013    negative per pt         Current Outpatient Medications:     estradiol (MINIVELLE, VIVELLE-DOT) 0.075 MG/24HR patch, Place 1 patch on the skin as directed by provider 2 (Two) Times a Week., Disp: , Rfl:     Progesterone (PROMETRIUM) 100 MG capsule, Take 1 capsule by mouth every night at bedtime., Disp: , Rfl:     acetaminophen (TYLENOL) 500 MG tablet, Take 2 tablets by mouth Every 6 (Six) Hours As Needed for Mild Pain. (Patient not taking: Reported on 11/8/2023), Disp: , Rfl:     methocarbamol (ROBAXIN) 500 MG tablet, Take 1 tablet by mouth 2 (Two) Times a Day As Needed for Muscle Spasms., Disp: 30 tablet, Rfl: 0    OBJECTIVE  Vital Signs:   /73   Pulse 79   Ht 165.1 cm (65\")   Wt 87.1 kg (192 lb)   SpO2 100%   BMI 31.95 kg/m²    Estimated body mass index is 31.95 kg/m² as calculated from the following:    Height as of this encounter: 165.1 cm (65\").    Weight as of this encounter: 87.1 kg (192 lb).     Wt Readings from Last 3 Encounters:   11/08/23 87.1 kg (192 lb)   12/23/21 83.6 kg (184 lb 3.2 oz)   11/02/21 83.8 kg (184 lb 12.8 oz)     BP Readings from Last 3 Encounters:   11/08/23 109/73   12/23/21 107/82   10/26/21 104/57       Physical Exam  Vitals reviewed.   Constitutional:       General: She is not in acute distress.     Appearance: Normal appearance. She is well-developed. She is obese. She is not diaphoretic.   HENT:      Head: Normocephalic and atraumatic. Hair is normal.      Right Ear: Hearing, tympanic membrane, ear canal and external ear normal. No decreased hearing noted. No drainage.      Left Ear: Hearing, tympanic membrane, ear canal and external ear normal. No decreased hearing noted.      Nose: Nose normal. No nasal deformity.      Mouth/Throat:      Mouth: Mucous membranes are moist.   Eyes:      General: Lids are normal.         Right eye: No discharge.         Left eye: No discharge.      Extraocular Movements: Extraocular movements " intact.      Conjunctiva/sclera: Conjunctivae normal.      Pupils: Pupils are equal, round, and reactive to light.   Neck:      Thyroid: No thyromegaly.      Vascular: No JVD.   Cardiovascular:      Rate and Rhythm: Normal rate and regular rhythm.      Pulses: Normal pulses.      Heart sounds: Normal heart sounds. No murmur heard.     No friction rub. No gallop.   Pulmonary:      Effort: Pulmonary effort is normal. No respiratory distress.      Breath sounds: Normal breath sounds. No wheezing or rales.   Chest:      Chest wall: No tenderness.   Abdominal:      General: Bowel sounds are normal. There is no distension.      Palpations: Abdomen is soft. There is no mass.      Tenderness: There is no abdominal tenderness. There is no guarding or rebound.      Hernia: No hernia is present.   Musculoskeletal:         General: No tenderness or deformity. Normal range of motion.      Cervical back: Normal range of motion and neck supple. No swelling, tenderness or bony tenderness. Normal range of motion.      Thoracic back: No swelling, tenderness or bony tenderness. Normal range of motion.   Lymphadenopathy:      Cervical: No cervical adenopathy.   Skin:     General: Skin is warm and dry.      Findings: No erythema or rash.   Neurological:      Mental Status: She is alert and oriented to person, place, and time.      Cranial Nerves: No cranial nerve deficit.      Motor: No abnormal muscle tone.      Coordination: Coordination normal.      Deep Tendon Reflexes: Reflexes are normal and symmetric. Reflexes normal.   Psychiatric:         Mood and Affect: Mood normal.         Behavior: Behavior normal.         Thought Content: Thought content normal.         Judgment: Judgment normal.          Result Review                    Lab Results   Component Value Date    FREET4 1.1 01/31/2019          No Images in the past 120 days found..     The above data has been reviewed by ASHLEY Harden 11/08/2023 12:43 EST.           Patient Care Team:  Lolita Montero APRN as PCP - General (Nurse Practitioner)  Naida Paredes MD as Consulting Physician (Obstetrics and Gynecology)    BMI is >= 30 and <35. (Class 1 Obesity). The following options were offered after discussion;: exercise counseling/recommendations and nutrition counseling/recommendations       ASSESSMENT & PLAN    Diagnoses and all orders for this visit:    1. Annual physical exam (Primary)  -     Comprehensive Metabolic Panel; Future  -     CBC & Differential; Future  -     Lipid Panel; Future  -     TSH Rfx On Abnormal To Free T4; Future    2. Class 1 obesity with body mass index (BMI) of 31.0 to 31.9 in adult, unspecified obesity type, unspecified whether serious comorbidity present  Assessment & Plan:  Patient's (Body mass index is 31.95 kg/m².) indicates that they are obese (BMI >30) with health conditions that include none . Weight is unchanged. BMI  is above average; BMI management plan is completed. We discussed portion control and increasing exercise.    Patient is to start a food journal, calculate daily calorie intake, increase exercise, and follow-up in 1 month for reevaluation.  We briefly discussed Saxenda and Wegovy,      3. Thyroid disorder screen  -     TSH Rfx On Abnormal To Free T4; Future    4. Lipid screening  -     Lipid Panel; Future    5. Neck pain  -     XR Spine Cervical 2 or 3 View; Future  -     methocarbamol (ROBAXIN) 500 MG tablet; Take 1 tablet by mouth 2 (Two) Times a Day As Needed for Muscle Spasms.  Dispense: 30 tablet; Refill: 0    6. Thoracic back pain, unspecified back pain laterality, unspecified chronicity  -     XR Spine Thoracic 3 View; Future  -     methocarbamol (ROBAXIN) 500 MG tablet; Take 1 tablet by mouth 2 (Two) Times a Day As Needed for Muscle Spasms.  Dispense: 30 tablet; Refill: 0    Trial of methocarbamol, side effects administration of medication discussed.    Tobacco Use: Low Risk  (11/8/2023)    Patient History      Smoking Tobacco Use: Never     Smokeless Tobacco Use: Never     Passive Exposure: Not on file       The patient is advised to begin progressive daily aerobic exercise program, follow a low fat, low cholesterol diet, attempt to lose weight, continue current medications, and return for routine annual checkups.    Follow Up     Return in about 3 months (around 2/8/2024), or if symptoms worsen or fail to improve.        Patient was given instructions and counseling regarding her condition or for health maintenance advice. Please see specific information pulled into the AVS if appropriate.   I have reviewed information obtained and documented by others and I have confirmed the accuracy of this documented note.    ASHLEY Harden

## 2024-02-02 ENCOUNTER — LAB (OUTPATIENT)
Dept: LAB | Facility: HOSPITAL | Age: 54
End: 2024-02-02
Payer: COMMERCIAL

## 2024-02-02 DIAGNOSIS — Z13.220 LIPID SCREENING: ICD-10-CM

## 2024-02-02 DIAGNOSIS — Z13.29 THYROID DISORDER SCREEN: ICD-10-CM

## 2024-02-02 DIAGNOSIS — Z00.00 ANNUAL PHYSICAL EXAM: ICD-10-CM

## 2024-02-02 LAB
ALBUMIN SERPL-MCNC: 4.3 G/DL (ref 3.5–5.2)
ALBUMIN/GLOB SERPL: 1.3 G/DL
ALP SERPL-CCNC: 78 U/L (ref 39–117)
ALT SERPL W P-5'-P-CCNC: 20 U/L (ref 1–33)
ANION GAP SERPL CALCULATED.3IONS-SCNC: 10.3 MMOL/L (ref 5–15)
AST SERPL-CCNC: 21 U/L (ref 1–32)
BASOPHILS # BLD AUTO: 0.07 10*3/MM3 (ref 0–0.2)
BASOPHILS NFR BLD AUTO: 1 % (ref 0–1.5)
BILIRUB SERPL-MCNC: 0.6 MG/DL (ref 0–1.2)
BUN SERPL-MCNC: 14 MG/DL (ref 6–20)
BUN/CREAT SERPL: 12.7 (ref 7–25)
CALCIUM SPEC-SCNC: 9.7 MG/DL (ref 8.6–10.5)
CHLORIDE SERPL-SCNC: 106 MMOL/L (ref 98–107)
CHOLEST SERPL-MCNC: 177 MG/DL (ref 0–200)
CO2 SERPL-SCNC: 24.7 MMOL/L (ref 22–29)
CREAT SERPL-MCNC: 1.1 MG/DL (ref 0.57–1)
DEPRECATED RDW RBC AUTO: 40.1 FL (ref 37–54)
EGFRCR SERPLBLD CKD-EPI 2021: 59.8 ML/MIN/1.73
EOSINOPHIL # BLD AUTO: 0.16 10*3/MM3 (ref 0–0.4)
EOSINOPHIL NFR BLD AUTO: 2.2 % (ref 0.3–6.2)
ERYTHROCYTE [DISTWIDTH] IN BLOOD BY AUTOMATED COUNT: 13 % (ref 12.3–15.4)
GLOBULIN UR ELPH-MCNC: 3.2 GM/DL
GLUCOSE SERPL-MCNC: 101 MG/DL (ref 65–99)
HCT VFR BLD AUTO: 41.2 % (ref 34–46.6)
HDLC SERPL-MCNC: 41 MG/DL (ref 40–60)
HGB BLD-MCNC: 13.5 G/DL (ref 12–15.9)
IMM GRANULOCYTES # BLD AUTO: 0.03 10*3/MM3 (ref 0–0.05)
IMM GRANULOCYTES NFR BLD AUTO: 0.4 % (ref 0–0.5)
LDLC SERPL CALC-MCNC: 120 MG/DL (ref 0–100)
LDLC/HDLC SERPL: 2.9 {RATIO}
LYMPHOCYTES # BLD AUTO: 2.35 10*3/MM3 (ref 0.7–3.1)
LYMPHOCYTES NFR BLD AUTO: 32.6 % (ref 19.6–45.3)
MCH RBC QN AUTO: 28.1 PG (ref 26.6–33)
MCHC RBC AUTO-ENTMCNC: 32.8 G/DL (ref 31.5–35.7)
MCV RBC AUTO: 85.8 FL (ref 79–97)
MONOCYTES # BLD AUTO: 0.33 10*3/MM3 (ref 0.1–0.9)
MONOCYTES NFR BLD AUTO: 4.6 % (ref 5–12)
NEUTROPHILS NFR BLD AUTO: 4.26 10*3/MM3 (ref 1.7–7)
NEUTROPHILS NFR BLD AUTO: 59.2 % (ref 42.7–76)
NRBC BLD AUTO-RTO: 0 /100 WBC (ref 0–0.2)
PLATELET # BLD AUTO: 341 10*3/MM3 (ref 140–450)
PMV BLD AUTO: 9.8 FL (ref 6–12)
POTASSIUM SERPL-SCNC: 4.1 MMOL/L (ref 3.5–5.2)
PROT SERPL-MCNC: 7.5 G/DL (ref 6–8.5)
RBC # BLD AUTO: 4.8 10*6/MM3 (ref 3.77–5.28)
SODIUM SERPL-SCNC: 141 MMOL/L (ref 136–145)
TRIGL SERPL-MCNC: 85 MG/DL (ref 0–150)
TSH SERPL DL<=0.05 MIU/L-ACNC: 2.16 UIU/ML (ref 0.27–4.2)
VLDLC SERPL-MCNC: 16 MG/DL (ref 5–40)
WBC NRBC COR # BLD AUTO: 7.2 10*3/MM3 (ref 3.4–10.8)

## 2024-02-02 PROCEDURE — 80061 LIPID PANEL: CPT

## 2024-02-02 PROCEDURE — 36415 COLL VENOUS BLD VENIPUNCTURE: CPT

## 2024-02-02 PROCEDURE — 80050 GENERAL HEALTH PANEL: CPT

## 2024-02-05 DIAGNOSIS — R79.89 ELEVATED SERUM CREATININE: Primary | ICD-10-CM

## 2024-02-09 ENCOUNTER — OFFICE VISIT (OUTPATIENT)
Dept: FAMILY MEDICINE CLINIC | Facility: CLINIC | Age: 54
End: 2024-02-09
Payer: COMMERCIAL

## 2024-02-09 VITALS
SYSTOLIC BLOOD PRESSURE: 117 MMHG | BODY MASS INDEX: 32.99 KG/M2 | DIASTOLIC BLOOD PRESSURE: 51 MMHG | HEIGHT: 65 IN | HEART RATE: 69 BPM | OXYGEN SATURATION: 100 % | WEIGHT: 198 LBS

## 2024-02-09 DIAGNOSIS — R73.09 ELEVATED GLUCOSE: Primary | ICD-10-CM

## 2024-02-09 DIAGNOSIS — E66.9 CLASS 1 OBESITY WITHOUT SERIOUS COMORBIDITY WITH BODY MASS INDEX (BMI) OF 32.0 TO 32.9 IN ADULT, UNSPECIFIED OBESITY TYPE: ICD-10-CM

## 2024-02-09 DIAGNOSIS — M54.9 UPPER BACK PAIN: ICD-10-CM

## 2024-02-09 DIAGNOSIS — B37.2 INTERTRIGINOUS CANDIDIASIS: ICD-10-CM

## 2024-02-09 DIAGNOSIS — N62 LARGE BREASTS: ICD-10-CM

## 2024-02-09 PROCEDURE — 99213 OFFICE O/P EST LOW 20 MIN: CPT | Performed by: NURSE PRACTITIONER

## 2024-02-09 RX ORDER — CLOTRIMAZOLE 1 %
1 CREAM (GRAM) TOPICAL 2 TIMES DAILY
Qty: 60 G | Refills: 0 | Status: SHIPPED | OUTPATIENT
Start: 2024-02-09

## 2024-02-09 NOTE — PROGRESS NOTES
"Chief Complaint  Obesity, lab f/u     SUBJECTIVE  Ronna Bajwa presents to [facility] for 3 month follow up.  Would like to discuss obesity and follow-up on her labs.    Pt states just started \"my fitness pal\" first of this month, has been trying to lose weight for some time.    Pt is interested in getting breast reduction, states has a lot of shoulder and upper back pain.     Pt states also gets a lot irritation/ yeats infection under her breasts, requesting antifungal cream, states has had this prescribed to her for this a few times in the past  History of Present Illness  Past Medical History:   Diagnosis Date    Anemia     History of transfusion     Obesity       Family History   Problem Relation Age of Onset    Diabetes Mother     Heart disease Mother     Alzheimer's disease Father     Parkinsonism Father     Colon cancer Father     Heart disease Father     Colon polyps Father     Cancer Sister     Heart disease Sister     Cancer Maternal Grandmother     Birth defects Son          at birth    Miscarriages / Stillbirths Son     Malig Hyperthermia Neg Hx       Past Surgical History:   Procedure Laterality Date    ABDOMINAL SURGERY  ,     BARIATRIC SURGERY  ,     SECTION      x2    COLONOSCOPY  approx     negative per pt     COLONOSCOPY N/A 2021    Procedure: COLONOSCOPY to cecum with biopsies with hot snare polypectomy;  Surgeon: Abdelrahman Gresham MD;  Location: [facility] ENDOSCOPY;  Service: Gastroenterology;  Laterality: N/A;  Pre: family h/x colon cancer, change in bowel habits  Post: polyp, hemorrhoids    ENDOMETRIAL ABLATION      LAPAROSCOPIC GASTRIC BANDING      and removal, and replacement    LAPAROSCOPY HYSTEROSCOPY ENDOMETRIAL ABLATION      LASIK      TUBAL ABDOMINAL LIGATION      UPPER GASTROINTESTINAL ENDOSCOPY  approx     negative per pt         Current Outpatient Medications:     estradiol (MINIVELLE, VIVELLE-DOT) " "0.075 MG/24HR patch, Place 1 patch on the skin as directed by provider 2 (Two) Times a Week., Disp: , Rfl:     Progesterone (PROMETRIUM) 100 MG capsule, Take 1 capsule by mouth every night at bedtime., Disp: , Rfl:     clotrimazole (LOTRIMIN) 1 % cream, Apply 1 Application topically to the appropriate area as directed 2 (Two) Times a Day., Disp: 60 g, Rfl: 0    OBJECTIVE  Vital Signs:   /51   Pulse 69   Ht 165.1 cm (65\")   Wt 89.8 kg (198 lb)   SpO2 100%   BMI 32.95 kg/m²    Estimated body mass index is 32.95 kg/m² as calculated from the following:    Height as of this encounter: 165.1 cm (65\").    Weight as of this encounter: 89.8 kg (198 lb).     Wt Readings from Last 3 Encounters:   02/09/24 89.8 kg (198 lb)   11/08/23 87.1 kg (192 lb)   12/23/21 83.6 kg (184 lb 3.2 oz)     BP Readings from Last 3 Encounters:   02/09/24 117/51   11/08/23 109/73   12/23/21 107/82       Physical Exam  Vitals reviewed.   Constitutional:       Appearance: Normal appearance. She is well-developed.   HENT:      Head: Normocephalic and atraumatic.      Right Ear: External ear normal.      Left Ear: External ear normal.   Eyes:      Conjunctiva/sclera: Conjunctivae normal.      Pupils: Pupils are equal, round, and reactive to light.   Cardiovascular:      Rate and Rhythm: Normal rate and regular rhythm.      Heart sounds: No murmur heard.     No friction rub. No gallop.   Pulmonary:      Effort: Pulmonary effort is normal.      Breath sounds: Normal breath sounds. No wheezing or rhonchi.   Skin:     General: Skin is warm and dry.   Neurological:      Mental Status: She is alert and oriented to person, place, and time.      Cranial Nerves: No cranial nerve deficit.   Psychiatric:         Mood and Affect: Mood and affect normal.         Behavior: Behavior normal.         Thought Content: Thought content normal.         Judgment: Judgment normal.          Result Review    CMP          2/2/2024    10:56   CMP   Glucose 101    BUN " 14    Creatinine 1.10    EGFR 59.8    Sodium 141    Potassium 4.1    Chloride 106    Calcium 9.7    Total Protein 7.5    Albumin 4.3    Globulin 3.2    Total Bilirubin 0.6    Alkaline Phosphatase 78    AST (SGOT) 21    ALT (SGPT) 20    Albumin/Globulin Ratio 1.3    BUN/Creatinine Ratio 12.7    Anion Gap 10.3      CBC          2/2/2024    10:56   CBC   WBC 7.20    RBC 4.80    Hemoglobin 13.5    Hematocrit 41.2    MCV 85.8    MCH 28.1    MCHC 32.8    RDW 13.0    Platelets 341      Lipid Panel          2/2/2024    10:56   Lipid Panel   Total Cholesterol 177    Triglycerides 85    HDL Cholesterol 41    VLDL Cholesterol 16    LDL Cholesterol  120    LDL/HDL Ratio 2.90      TSH          2/2/2024    10:56   TSH   TSH 2.160                  Lab Results   Component Value Date    FREET4 1.1 01/31/2019          XR Spine Thoracic 3 View    Result Date: 11/8/2023    1. No acute osseous process identified.  2. Mild degenerative changes as described above.     ED CHAVIS MD       Electronically Signed and Approved By: ED CHAVIS MD on 11/08/2023 at 14:54             XR Spine Cervical 2 or 3 View    Result Date: 11/8/2023    1. Evidence for some degenerative change.     SHANEL LAMBERT MD       Electronically Signed and Approved By: SHANEL LAMBERT MD on 11/08/2023 at 14:31                The above data has been reviewed by ASHLEY Harden 02/09/2024 08:31 EST.          Patient Care Team:  Lolita Montero APRN as PCP - General (Nurse Practitioner)  Naida Paredes MD as Consulting Physician (Obstetrics and Gynecology)            ASSESSMENT & PLAN    Diagnoses and all orders for this visit:    1. Elevated glucose (Primary)  -     Hemoglobin A1c; Future    2. Class 1 obesity without serious comorbidity with body mass index (BMI) of 32.0 to 32.9 in adult, unspecified obesity type  Assessment & Plan:  Patient's (Body mass index is 32.95 kg/m².) indicates that they are obese (BMI >30) with health conditions that  include none . Weight is worsening. BMI  is above average; BMI management plan is completed. We discussed portion control and increasing exercise.  We again briefly discussed the weight loss medication Saxenda and Wegovy, patient reports that her insurance requires a 3-month monitored attempt at weight loss through [employer], she intends to start this program      3. Intertriginous candidiasis  -     clotrimazole (LOTRIMIN) 1 % cream; Apply 1 Application topically to the appropriate area as directed 2 (Two) Times a Day.  Dispense: 60 g; Refill: 0  -     Ambulatory Referral to Plastic Surgery    4. Large breasts  -     Ambulatory Referral to Plastic Surgery    5. Upper back pain  -     Ambulatory Referral to Plastic Surgery         Tobacco Use: Low Risk  (2/9/2024)    Patient History     Smoking Tobacco Use: Never     Smokeless Tobacco Use: Never     Passive Exposure: Not on file       Follow Up     Return in about 6 months (around 8/9/2024), or if symptoms worsen or fail to improve.        Patient was given instructions and counseling regarding her condition or for health maintenance advice. Please see specific information pulled into the AVS if appropriate.   I have reviewed information obtained and documented by others and I have confirmed the accuracy of this documented note.    ASHLEY Harden

## 2024-02-09 NOTE — ASSESSMENT & PLAN NOTE
Patient's (Body mass index is 32.95 kg/m².) indicates that they are obese (BMI >30) with health conditions that include none . Weight is worsening. BMI  is above average; BMI management plan is completed. We discussed portion control and increasing exercise.  We again briefly discussed the weight loss medication Saxenda and Wegovy, patient reports that her insurance requires a 3-month monitored attempt at weight loss through [employer], she intends to start this program

## 2024-03-09 ENCOUNTER — LAB (OUTPATIENT)
Dept: LAB | Facility: HOSPITAL | Age: 54
End: 2024-03-09
Payer: COMMERCIAL

## 2024-03-09 DIAGNOSIS — R79.89 ELEVATED SERUM CREATININE: ICD-10-CM

## 2024-03-09 DIAGNOSIS — R73.09 ELEVATED GLUCOSE: ICD-10-CM

## 2024-03-09 LAB
ANION GAP SERPL CALCULATED.3IONS-SCNC: 8 MMOL/L (ref 5–15)
BUN SERPL-MCNC: 13 MG/DL (ref 6–20)
BUN/CREAT SERPL: 11.9 (ref 7–25)
CALCIUM SPEC-SCNC: 9.5 MG/DL (ref 8.6–10.5)
CHLORIDE SERPL-SCNC: 107 MMOL/L (ref 98–107)
CO2 SERPL-SCNC: 26 MMOL/L (ref 22–29)
CREAT SERPL-MCNC: 1.09 MG/DL (ref 0.57–1)
EGFRCR SERPLBLD CKD-EPI 2021: 60.5 ML/MIN/1.73
GLUCOSE SERPL-MCNC: 99 MG/DL (ref 65–99)
HBA1C MFR BLD: 5.8 % (ref 4.8–5.6)
POTASSIUM SERPL-SCNC: 4.4 MMOL/L (ref 3.5–5.2)
SODIUM SERPL-SCNC: 141 MMOL/L (ref 136–145)

## 2024-03-09 PROCEDURE — 80048 BASIC METABOLIC PNL TOTAL CA: CPT

## 2024-03-09 PROCEDURE — 36415 COLL VENOUS BLD VENIPUNCTURE: CPT

## 2024-03-09 PROCEDURE — 83036 HEMOGLOBIN GLYCOSYLATED A1C: CPT

## 2024-04-17 ENCOUNTER — TELEPHONE (OUTPATIENT)
Dept: FAMILY MEDICINE CLINIC | Facility: CLINIC | Age: 54
End: 2024-04-17
Payer: COMMERCIAL

## 2024-04-18 RX ORDER — SEMAGLUTIDE 0.25 MG/.5ML
0.25 INJECTION, SOLUTION SUBCUTANEOUS WEEKLY
Qty: 2 ML | Refills: 1 | Status: SHIPPED | OUTPATIENT
Start: 2024-04-18 | End: 2024-05-06

## 2024-05-02 ENCOUNTER — TRANSCRIBE ORDERS (OUTPATIENT)
Dept: ADMINISTRATIVE | Facility: HOSPITAL | Age: 54
End: 2024-05-02
Payer: COMMERCIAL

## 2024-05-02 DIAGNOSIS — R31.9 HEMATURIA, UNSPECIFIED TYPE: Primary | ICD-10-CM

## 2024-05-03 ENCOUNTER — LAB (OUTPATIENT)
Dept: LAB | Facility: HOSPITAL | Age: 54
End: 2024-05-03
Payer: COMMERCIAL

## 2024-05-03 DIAGNOSIS — R31.9 HEMATURIA, UNSPECIFIED TYPE: ICD-10-CM

## 2024-05-03 LAB
BACTERIA UR QL AUTO: ABNORMAL /HPF
BILIRUB UR QL STRIP: NEGATIVE
CLARITY UR: CLEAR
COLOR UR: ABNORMAL
GLUCOSE UR STRIP-MCNC: NEGATIVE MG/DL
HGB UR QL STRIP.AUTO: NEGATIVE
HYALINE CASTS UR QL AUTO: ABNORMAL /LPF
KETONES UR QL STRIP: NEGATIVE
LEUKOCYTE ESTERASE UR QL STRIP.AUTO: ABNORMAL
NITRITE UR QL STRIP: NEGATIVE
PH UR STRIP.AUTO: 6.5 [PH] (ref 5–8)
PROT UR QL STRIP: NEGATIVE
RBC # UR STRIP: ABNORMAL /HPF
REF LAB TEST METHOD: ABNORMAL
SP GR UR STRIP: 1.02 (ref 1–1.03)
SQUAMOUS #/AREA URNS HPF: ABNORMAL /HPF
UROBILINOGEN UR QL STRIP: ABNORMAL
WBC # UR STRIP: ABNORMAL /HPF

## 2024-05-03 PROCEDURE — 81001 URINALYSIS AUTO W/SCOPE: CPT

## 2024-05-03 RX ORDER — SEMAGLUTIDE 0.25 MG/.5ML
0.25 INJECTION, SOLUTION SUBCUTANEOUS WEEKLY
Qty: 2 ML | Refills: 1 | Status: CANCELLED | OUTPATIENT
Start: 2024-05-03

## 2024-05-06 RX ORDER — SEMAGLUTIDE 0.5 MG/.5ML
0.5 INJECTION, SOLUTION SUBCUTANEOUS WEEKLY
Qty: 2 ML | Refills: 1 | Status: SHIPPED | OUTPATIENT
Start: 2024-05-06

## 2024-06-06 RX ORDER — SEMAGLUTIDE 0.5 MG/.5ML
0.5 INJECTION, SOLUTION SUBCUTANEOUS WEEKLY
Qty: 2 ML | Refills: 1 | Status: CANCELLED | OUTPATIENT
Start: 2024-06-06

## 2024-06-06 RX ORDER — SEMAGLUTIDE 1 MG/.5ML
1 INJECTION, SOLUTION SUBCUTANEOUS WEEKLY
Qty: 2 ML | Refills: 1 | Status: SHIPPED | OUTPATIENT
Start: 2024-06-06

## 2024-06-06 NOTE — TELEPHONE ENCOUNTER
Caller: Ronna Bajwa    Relationship: Self    Best call back number: [phone]    Requested Prescriptions:   Requested Prescriptions     Pending Prescriptions Disp Refills    Semaglutide-Weight Management (Wegovy) 0.5 MG/0.5ML solution auto-injector 2 mL 1     Sig: Inject 0.5 mL under the skin into the appropriate area as directed 1 (One) Time Per Week.        Pharmacy where request should be sent: [address] [phone] [facility] [phone]      Last office visit with prescribing clinician: 2/9/2024   Last telemedicine visit with prescribing clinician: Visit date not found   Next office visit with prescribing clinician: Visit date not found     Additional details provided by patient: PATIENT STATES SHE IS READY FOR THE NEXT DOSAGE OF THIS MEDICATION.     Does the patient have less than a 3 day supply:  [] Yes  [x] No    Would you like a call back once the refill request has been completed: [] Yes [] No    If the office needs to give you a call back, can they leave a voicemail: [] Yes [] No    Nemo Valdovinos Rep   06/06/24 12:19 EDT

## 2024-06-24 ENCOUNTER — TELEPHONE (OUTPATIENT)
Dept: FAMILY MEDICINE CLINIC | Facility: CLINIC | Age: 54
End: 2024-06-24
Payer: COMMERCIAL

## 2024-06-24 NOTE — TELEPHONE ENCOUNTER
Please notify patient that she is overdue for follow-up, for continued prescriptions of Wegovy patient must be seen every 3 months to monitor weight

## 2024-06-27 RX ORDER — SEMAGLUTIDE 1 MG/.5ML
1 INJECTION, SOLUTION SUBCUTANEOUS WEEKLY
Qty: 2 ML | Refills: 1 | Status: SHIPPED | OUTPATIENT
Start: 2024-06-27

## 2024-07-17 ENCOUNTER — OFFICE VISIT (OUTPATIENT)
Dept: FAMILY MEDICINE CLINIC | Facility: CLINIC | Age: 54
End: 2024-07-17
Payer: COMMERCIAL

## 2024-07-17 VITALS
OXYGEN SATURATION: 100 % | WEIGHT: 181 LBS | SYSTOLIC BLOOD PRESSURE: 100 MMHG | BODY MASS INDEX: 30.16 KG/M2 | DIASTOLIC BLOOD PRESSURE: 48 MMHG | HEART RATE: 87 BPM | HEIGHT: 65 IN

## 2024-07-17 DIAGNOSIS — E66.9 CLASS 1 OBESITY WITH BODY MASS INDEX (BMI) OF 30.0 TO 30.9 IN ADULT, UNSPECIFIED OBESITY TYPE, UNSPECIFIED WHETHER SERIOUS COMORBIDITY PRESENT: Primary | ICD-10-CM

## 2024-07-17 DIAGNOSIS — R73.03 PREDIABETES: ICD-10-CM

## 2024-07-17 PROCEDURE — 99214 OFFICE O/P EST MOD 30 MIN: CPT | Performed by: NURSE PRACTITIONER

## 2024-07-17 NOTE — PROGRESS NOTES
Chief Complaint  F/u Obesity     SUBJECTIVE  Ronna GUME Bajwa presents to [facility] for three month follow up on  Wegovy.  Patient states that she has initially had a few issues with nausea but is doing very well and ready to increase the dose    At last visit pt was 198 lbs, today is 181 lbs. patient has lost 18 pounds , happy with her results    Pt states she had a pap, mammogram, and vaginal ultrasound last month at [facility].  We are requesting records    History of Present Illness  Past Medical History:   Diagnosis Date    Anemia     History of transfusion     Obesity       Family History   Problem Relation Age of Onset    Diabetes Mother     Heart disease Mother     Alzheimer's disease Father     Parkinsonism Father     Colon cancer Father     Heart disease Father     Colon polyps Father     Cancer Sister     Heart disease Sister     Cancer Maternal Grandmother     Birth defects Son          at birth    Miscarriages / Stillbirths Son     Malamparo Hyperthermia Neg Hx       Past Surgical History:   Procedure Laterality Date    ABDOMINAL SURGERY  ,     BARIATRIC SURGERY  ,     SECTION      x2    COLONOSCOPY  approx     negative per pt     COLONOSCOPY N/A 2021    Procedure: COLONOSCOPY to cecum with biopsies with hot snare polypectomy;  Surgeon: Abdelrahman Gresham MD;  Location: [facility];  Service: Gastroenterology;  Laterality: N/A;  Pre: family h/x colon cancer, change in bowel habits  Post: polyp, hemorrhoids    ENDOMETRIAL ABLATION      LAPAROSCOPIC GASTRIC BANDING      and removal, and replacement    LAPAROSCOPY HYSTEROSCOPY ENDOMETRIAL ABLATION      LASIK      TUBAL ABDOMINAL LIGATION      UPPER GASTROINTESTINAL ENDOSCOPY  approx     negative per pt         Current Outpatient Medications:     Progesterone (PROMETRIUM) 100 MG capsule, Take 1 capsule by mouth every night at bedtime., Disp: , Rfl:     Semaglutide-Weight  "Management 1.7 MG/0.75ML solution auto-injector, Inject 0.75 mL under the skin into the appropriate area as directed 1 (One) Time Per Week., Disp: 3 mL, Rfl: 1    OBJECTIVE  Vital Signs:   /48   Pulse 87   Ht 165.1 cm (65\")   Wt 82.1 kg (181 lb)   SpO2 100%   BMI 30.12 kg/m²    Estimated body mass index is 30.12 kg/m² as calculated from the following:    Height as of this encounter: 165.1 cm (65\").    Weight as of this encounter: 82.1 kg (181 lb).     Wt Readings from Last 3 Encounters:   07/17/24 82.1 kg (181 lb)   02/09/24 89.8 kg (198 lb)   11/08/23 87.1 kg (192 lb)     BP Readings from Last 3 Encounters:   07/17/24 100/48   02/09/24 117/51   11/08/23 109/73       Physical Exam  Vitals reviewed.   Constitutional:       Appearance: Normal appearance. She is well-developed.   HENT:      Head: Normocephalic and atraumatic.      Right Ear: External ear normal.      Left Ear: External ear normal.   Eyes:      Conjunctiva/sclera: Conjunctivae normal.      Pupils: Pupils are equal, round, and reactive to light.   Cardiovascular:      Rate and Rhythm: Normal rate and regular rhythm.      Heart sounds: No murmur heard.     No friction rub. No gallop.   Pulmonary:      Effort: Pulmonary effort is normal.      Breath sounds: Normal breath sounds. No wheezing or rhonchi.   Skin:     General: Skin is warm and dry.   Neurological:      Mental Status: She is alert and oriented to person, place, and time.      Cranial Nerves: No cranial nerve deficit.   Psychiatric:         Mood and Affect: Mood and affect normal.         Behavior: Behavior normal.         Thought Content: Thought content normal.         Judgment: Judgment normal.          Result Review    CMP          2/2/2024    10:56 3/9/2024    10:33   CMP   Glucose 101  99    BUN 14  13    Creatinine 1.10  1.09    EGFR 59.8  60.5    Sodium 141  141    Potassium 4.1  4.4    Chloride 106  107    Calcium 9.7  9.5    Total Protein 7.5     Albumin 4.3     Globulin 3.2 "     Total Bilirubin 0.6     Alkaline Phosphatase 78     AST (SGOT) 21     ALT (SGPT) 20     Albumin/Globulin Ratio 1.3     BUN/Creatinine Ratio 12.7  11.9    Anion Gap 10.3  8.0      CBC          2/2/2024    10:56   CBC   WBC 7.20    RBC 4.80    Hemoglobin 13.5    Hematocrit 41.2    MCV 85.8    MCH 28.1    MCHC 32.8    RDW 13.0    Platelets 341      Lipid Panel          2/2/2024    10:56   Lipid Panel   Total Cholesterol 177    Triglycerides 85    HDL Cholesterol 41    VLDL Cholesterol 16    LDL Cholesterol  120    LDL/HDL Ratio 2.90      TSH          2/2/2024    10:56   TSH   TSH 2.160      Most Recent A1C          3/9/2024    10:33   HGBA1C Most Recent   Hemoglobin A1C 5.80        A1C Last 3 Results          3/9/2024    10:33   HGBA1C Last 3 Results   Hemoglobin A1C 5.80        No Images in the past 120 days found..     The above data has been reviewed by ASHLEY Harden 07/17/2024 11:00 EDT.          Patient Care Team:  Lolita Montero APRN as PCP - General (Nurse Practitioner)  Naida Paredes MD as Consulting Physician (Obstetrics and Gynecology)            ASSESSMENT & PLAN    Diagnoses and all orders for this visit:    1. Class 1 obesity with body mass index (BMI) of 30.0 to 30.9 in adult, unspecified obesity type, unspecified whether serious comorbidity present (Primary)  Assessment & Plan:  Patient's (Body mass index is 30.12 kg/m².) indicates that they are obese (BMI >30) with health conditions that include none . Weight is improving with treatment. BMI  is above average; BMI management plan is completed. We discussed portion control and increasing exercise.     Orders:  -     Semaglutide-Weight Management 1.7 MG/0.75ML solution auto-injector; Inject 0.75 mL under the skin into the appropriate area as directed 1 (One) Time Per Week.  Dispense: 3 mL; Refill: 1    2. Prediabetes  Assessment & Plan:  Patient in prediabetic range upon last labs, A1c 5.8%, discussed diet and exercise changes,  patient has lost 18 pounds since that time, we will recheck A1c at our next follow-up with regular labs, she will continue to work on diet and exercise changes      Dose of Wegovy increased to 1.7 mg, discussed with patient if still tolerating well at fourth dose she may call and we will increase the dose again    Tobacco Use: Low Risk  (7/17/2024)    Patient History     Smoking Tobacco Use: Never     Smokeless Tobacco Use: Never     Passive Exposure: Not on file       Follow Up     Return in about 6 months (around 1/17/2025), or if symptoms worsen or fail to improve.        Patient was given instructions and counseling regarding her condition or for health maintenance advice. Please see specific information pulled into the AVS if appropriate.   I have reviewed information obtained and documented by others and I have confirmed the accuracy of this documented note.    ASHLEY Harden

## 2024-07-17 NOTE — ASSESSMENT & PLAN NOTE
Patient in prediabetic range upon last labs, A1c 5.8%, discussed diet and exercise changes, patient has lost 18 pounds since that time, we will recheck A1c at our next follow-up with regular labs, she will continue to work on diet and exercise changes

## 2024-07-17 NOTE — ASSESSMENT & PLAN NOTE
Patient's (Body mass index is 30.12 kg/m².) indicates that they are obese (BMI >30) with health conditions that include none . Weight is improving with treatment. BMI  is above average; BMI management plan is completed. We discussed portion control and increasing exercise.

## 2024-10-02 DIAGNOSIS — E66.811 CLASS 1 OBESITY WITH BODY MASS INDEX (BMI) OF 30.0 TO 30.9 IN ADULT, UNSPECIFIED OBESITY TYPE, UNSPECIFIED WHETHER SERIOUS COMORBIDITY PRESENT: ICD-10-CM

## 2024-10-02 RX ORDER — SEMAGLUTIDE 1.7 MG/.75ML
INJECTION, SOLUTION SUBCUTANEOUS
Qty: 3 ML | Refills: 1 | Status: SHIPPED | OUTPATIENT
Start: 2024-10-02

## 2024-10-30 ENCOUNTER — TELEPHONE (OUTPATIENT)
Dept: FAMILY MEDICINE CLINIC | Facility: CLINIC | Age: 54
End: 2024-10-30
Payer: COMMERCIAL

## 2024-10-30 NOTE — TELEPHONE ENCOUNTER
Caller: Ronna Bajwa    Relationship: Self    Best call back number: [phone]     What is the medical concern/diagnosis: SNORING REALLY BAD     What specialty or service is being requested: OTOLARYNGOLOGY     What is the provider, practice or medical service name: NIC BENNETT MD     What is the office location: [address] [place] [address]     What is the office phone number: [phone]     Any additional details: PLEASE CALL AND ADVISE

## 2024-10-30 NOTE — TELEPHONE ENCOUNTER
Need appointment to discuss, I see that we have an appointment next month, we can move this up if patient wants or we can place a referral at time of appointment

## 2024-11-11 ENCOUNTER — OFFICE VISIT (OUTPATIENT)
Dept: FAMILY MEDICINE CLINIC | Facility: CLINIC | Age: 54
End: 2024-11-11
Payer: COMMERCIAL

## 2024-11-11 VITALS
BODY MASS INDEX: 27.66 KG/M2 | OXYGEN SATURATION: 99 % | HEIGHT: 65 IN | DIASTOLIC BLOOD PRESSURE: 73 MMHG | SYSTOLIC BLOOD PRESSURE: 106 MMHG | WEIGHT: 166 LBS | HEART RATE: 87 BPM

## 2024-11-11 DIAGNOSIS — E66.3 OVERWEIGHT (BMI 25.0-29.9): ICD-10-CM

## 2024-11-11 DIAGNOSIS — R73.03 PREDIABETES: ICD-10-CM

## 2024-11-11 DIAGNOSIS — Z00.00 ANNUAL PHYSICAL EXAM: Primary | ICD-10-CM

## 2024-11-11 DIAGNOSIS — Z13.220 LIPID SCREENING: ICD-10-CM

## 2024-11-11 DIAGNOSIS — Z23 NEED FOR INFLUENZA VACCINATION: ICD-10-CM

## 2024-11-11 DIAGNOSIS — L98.7 EXCESS SKIN OF ABDOMEN: ICD-10-CM

## 2024-11-11 DIAGNOSIS — M54.9 UPPER BACK PAIN: ICD-10-CM

## 2024-11-11 DIAGNOSIS — R06.83 SNORING: ICD-10-CM

## 2024-11-11 DIAGNOSIS — Z13.29 THYROID DISORDER SCREEN: ICD-10-CM

## 2024-11-11 DIAGNOSIS — N62 LARGE BREASTS: ICD-10-CM

## 2024-11-11 DIAGNOSIS — B37.2 INTERTRIGINOUS CANDIDIASIS: ICD-10-CM

## 2024-11-11 PROCEDURE — 90471 IMMUNIZATION ADMIN: CPT | Performed by: NURSE PRACTITIONER

## 2024-11-11 PROCEDURE — 90656 IIV3 VACC NO PRSV 0.5 ML IM: CPT | Performed by: NURSE PRACTITIONER

## 2024-11-11 PROCEDURE — 99213 OFFICE O/P EST LOW 20 MIN: CPT | Performed by: NURSE PRACTITIONER

## 2024-11-11 PROCEDURE — 99396 PREV VISIT EST AGE 40-64: CPT | Performed by: NURSE PRACTITIONER

## 2024-11-11 RX ORDER — SEMAGLUTIDE 1.7 MG/.75ML
1.7 INJECTION, SOLUTION SUBCUTANEOUS WEEKLY
Qty: 9 ML | Refills: 1 | Status: SHIPPED | OUTPATIENT
Start: 2024-11-11

## 2024-11-11 NOTE — PROGRESS NOTES
Chief Complaint  Annual Exam and Obesity    SUBJECTIVE  Ronna Bajwa presents to [facility] for annual exam. Pt states she had lab work done last month through [email or web address], not sure what all was ordered but she will try to get me the results.    . Pt reports doig well with Wegovy, would like to stay on current dose for now as she is still losing weight..     Pt also requesting referral to plastics to see about having extra abd skin removed, has had several issues with rashes/yeats infection under her abd fold, and has a lot of chronic upper back pain due to her very large breasts.  We have placed this referral previously but patient was still losing weight at that time and decided that she did not want to schedule yet, states that she is very close to her goal weight and is now ready to schedule    Pt requesting referral to ENT, states she snores loudly and would like to eval to see if anything can be done with sinus to fix that, states she has had sleep study in the past which was negative, but she still snores    History of Present Illness  Past Medical History:   Diagnosis Date    Anemia     History of transfusion     Obesity       Family History   Problem Relation Age of Onset    Diabetes Mother     Heart disease Mother     Alzheimer's disease Father     Parkinsonism Father     Colon cancer Father     Heart disease Father     Colon polyps Father     Cancer Sister     Heart disease Sister     Cancer Maternal Grandmother     Birth defects Son          at birth    Miscarriages / Stillbirths Son     Malamparo Hyperthermia Neg Hx       Past Surgical History:   Procedure Laterality Date    ABDOMINAL SURGERY  ,     BARIATRIC SURGERY  ,     SECTION      x2    COLONOSCOPY  approx     negative per pt     COLONOSCOPY N/A 2021    Procedure: COLONOSCOPY to cecum with biopsies with hot snare polypectomy;  Surgeon: Abdelrahman Gresham  "MD;  Location: [facility];  Service: Gastroenterology;  Laterality: N/A;  Pre: family h/x colon cancer, change in bowel habits  Post: polyp, hemorrhoids    ENDOMETRIAL ABLATION      LAPAROSCOPIC GASTRIC BANDING      and removal, and replacement    LAPAROSCOPY HYSTEROSCOPY ENDOMETRIAL ABLATION      LASIK      TUBAL ABDOMINAL LIGATION  2002    UPPER GASTROINTESTINAL ENDOSCOPY  approx 2013    negative per pt         Current Outpatient Medications:     Semaglutide-Weight Management (Wegovy) 1.7 MG/0.75ML solution auto-injector, Inject 0.75 mL under the skin into the appropriate area as directed 1 (One) Time Per Week., Disp: 9 mL, Rfl: 1    OBJECTIVE  Vital Signs:   /73   Pulse 87   Ht 165.1 cm (65\")   Wt 75.3 kg (166 lb)   SpO2 99%   BMI 27.62 kg/m²    Estimated body mass index is 27.62 kg/m² as calculated from the following:    Height as of this encounter: 165.1 cm (65\").    Weight as of this encounter: 75.3 kg (166 lb).     Wt Readings from Last 3 Encounters:   11/11/24 75.3 kg (166 lb)   07/17/24 82.1 kg (181 lb)   02/09/24 89.8 kg (198 lb)     BP Readings from Last 3 Encounters:   11/11/24 106/73   07/17/24 100/48   02/09/24 117/51       Physical Exam  Vitals reviewed.   Constitutional:       General: She is not in acute distress.     Appearance: Normal appearance. She is well-developed. She is not diaphoretic.   HENT:      Head: Normocephalic and atraumatic. Hair is normal.      Right Ear: Hearing, tympanic membrane, ear canal and external ear normal. No decreased hearing noted. No drainage.      Left Ear: Hearing, tympanic membrane, ear canal and external ear normal. No decreased hearing noted.      Nose: Nose normal. No nasal deformity.      Mouth/Throat:      Mouth: Mucous membranes are moist.   Eyes:      General: Lids are normal.         Right eye: No discharge.         Left eye: No discharge.      Extraocular Movements: Extraocular movements intact.      Conjunctiva/sclera: Conjunctivae " normal.      Pupils: Pupils are equal, round, and reactive to light.   Neck:      Thyroid: No thyromegaly.      Vascular: No JVD.   Cardiovascular:      Rate and Rhythm: Normal rate and regular rhythm.      Pulses: Normal pulses.      Heart sounds: Normal heart sounds. No murmur heard.     No friction rub. No gallop.   Pulmonary:      Effort: Pulmonary effort is normal. No respiratory distress.      Breath sounds: Normal breath sounds. No wheezing or rales.   Chest:      Chest wall: No tenderness.   Abdominal:      General: Bowel sounds are normal. There is no distension.      Palpations: Abdomen is soft. There is no mass.      Tenderness: There is no abdominal tenderness. There is no guarding or rebound.      Hernia: No hernia is present.   Musculoskeletal:         General: No tenderness or deformity. Normal range of motion.      Cervical back: Normal range of motion and neck supple.   Lymphadenopathy:      Cervical: No cervical adenopathy.   Skin:     General: Skin is warm and dry.      Findings: No erythema or rash.   Neurological:      Mental Status: She is alert and oriented to person, place, and time.      Cranial Nerves: No cranial nerve deficit.      Motor: No abnormal muscle tone.      Coordination: Coordination normal.      Deep Tendon Reflexes: Reflexes are normal and symmetric. Reflexes normal.   Psychiatric:         Mood and Affect: Mood normal.         Behavior: Behavior normal.         Thought Content: Thought content normal.         Judgment: Judgment normal.          Result Review    CMP          2/2/2024    10:56 3/9/2024    10:33   CMP   Glucose 101  99    BUN 14  13    Creatinine 1.10  1.09    EGFR 59.8  60.5    Sodium 141  141    Potassium 4.1  4.4    Chloride 106  107    Calcium 9.7  9.5    Total Protein 7.5     Albumin 4.3     Globulin 3.2     Total Bilirubin 0.6     Alkaline Phosphatase 78     AST (SGOT) 21     ALT (SGPT) 20     Albumin/Globulin Ratio 1.3     BUN/Creatinine Ratio 12.7  11.9     Anion Gap 10.3  8.0      CBC          2/2/2024    10:56   CBC   WBC 7.20    RBC 4.80    Hemoglobin 13.5    Hematocrit 41.2    MCV 85.8    MCH 28.1    MCHC 32.8    RDW 13.0    Platelets 341      Lipid Panel          2/2/2024    10:56   Lipid Panel   Total Cholesterol 177    Triglycerides 85    HDL Cholesterol 41    VLDL Cholesterol 16    LDL Cholesterol  120    LDL/HDL Ratio 2.90      TSH          2/2/2024    10:56   TSH   TSH 2.160      Most Recent A1C          3/9/2024    10:33   HGBA1C Most Recent   Hemoglobin A1C 5.80        A1C Last 3 Results          3/9/2024    10:33   HGBA1C Last 3 Results   Hemoglobin A1C 5.80          No Images in the past 120 days found..     The above data has been reviewed by ASHLEY Harden 11/11/2024 13:01 EST.          Patient Care Team:  Lolita Montero APRN as PCP - General (Nurse Practitioner)  Naida Paredes MD as Consulting Physician (Obstetrics and Gynecology)            ASSESSMENT & PLAN    Diagnoses and all orders for this visit:    1. Annual physical exam (Primary)  -     Hemoglobin A1c; Future  -     Comprehensive Metabolic Panel; Future  -     CBC & Differential; Future  -     Lipid Panel; Future  -     TSH Rfx On Abnormal To Free T4; Future    2. Need for influenza vaccination  -     Fluzone >6mos (1022-9483)    3. Thyroid disorder screen  -     TSH Rfx On Abnormal To Free T4; Future    4. Lipid screening  -     Lipid Panel; Future    5. Prediabetes  -     Hemoglobin A1c; Future    6. Excess skin of abdomen  -     Ambulatory Referral to Plastic Surgery    7. Large breasts  -     Ambulatory Referral to Plastic Surgery    8. Upper back pain  -     Ambulatory Referral to Plastic Surgery    9. Intertriginous candidiasis  -     Ambulatory Referral to Plastic Surgery    10. Overweight (BMI 25.0-29.9)  Assessment & Plan:  Patient's BMI is significantly improved, no longer in obese category.  She will continue current dose of Wegovy and continue to work on diet  and exercise changes    Orders:  -     Semaglutide-Weight Management (Wegovy) 1.7 MG/0.75ML solution auto-injector; Inject 0.75 mL under the skin into the appropriate area as directed 1 (One) Time Per Week.  Dispense: 9 mL; Refill: 1 11. Snoring  -     Cancel: Ambulatory Referral to ENT (Otolaryngology)  -     Ambulatory Referral to ENT (Otolaryngology)         Tobacco Use: Low Risk  (11/11/2024)    Patient History     Smoking Tobacco Use: Never     Smokeless Tobacco Use: Never     Passive Exposure: Not on file       Follow Up     Return in about 6 months (around 5/11/2025), or if symptoms worsen or fail to improve.        Patient was given instructions and counseling regarding her condition or for health maintenance advice. Please see specific information pulled into the AVS if appropriate.   I have reviewed information obtained and documented by others and I have confirmed the accuracy of this documented note.    ASHLEY Harden

## 2024-11-11 NOTE — ASSESSMENT & PLAN NOTE
Patient's BMI is significantly improved, no longer in obese category.  She will continue current dose of Wegovy and continue to work on diet and exercise changes

## 2024-12-03 ENCOUNTER — LAB (OUTPATIENT)
Dept: LAB | Facility: HOSPITAL | Age: 54
End: 2024-12-03
Payer: COMMERCIAL

## 2024-12-03 DIAGNOSIS — R73.03 PREDIABETES: ICD-10-CM

## 2024-12-03 DIAGNOSIS — Z13.29 THYROID DISORDER SCREEN: ICD-10-CM

## 2024-12-03 DIAGNOSIS — Z13.220 LIPID SCREENING: ICD-10-CM

## 2024-12-03 DIAGNOSIS — Z00.00 ANNUAL PHYSICAL EXAM: ICD-10-CM

## 2024-12-03 LAB
ALBUMIN SERPL-MCNC: 4.4 G/DL (ref 3.5–5.2)
ALBUMIN/GLOB SERPL: 1.5 G/DL
ALP SERPL-CCNC: 98 U/L (ref 39–117)
ALT SERPL W P-5'-P-CCNC: 10 U/L (ref 1–33)
ANION GAP SERPL CALCULATED.3IONS-SCNC: 8.3 MMOL/L (ref 5–15)
AST SERPL-CCNC: 18 U/L (ref 1–32)
BASOPHILS # BLD AUTO: 0.05 10*3/MM3 (ref 0–0.2)
BASOPHILS NFR BLD AUTO: 0.6 % (ref 0–1.5)
BILIRUB SERPL-MCNC: 0.4 MG/DL (ref 0–1.2)
BUN SERPL-MCNC: 16 MG/DL (ref 6–20)
BUN/CREAT SERPL: 14 (ref 7–25)
CALCIUM SPEC-SCNC: 9.9 MG/DL (ref 8.6–10.5)
CHLORIDE SERPL-SCNC: 106 MMOL/L (ref 98–107)
CHOLEST SERPL-MCNC: 200 MG/DL (ref 0–200)
CO2 SERPL-SCNC: 24.7 MMOL/L (ref 22–29)
CREAT SERPL-MCNC: 1.14 MG/DL (ref 0.57–1)
DEPRECATED RDW RBC AUTO: 40.5 FL (ref 37–54)
EGFRCR SERPLBLD CKD-EPI 2021: 57 ML/MIN/1.73
EOSINOPHIL # BLD AUTO: 0.14 10*3/MM3 (ref 0–0.4)
EOSINOPHIL NFR BLD AUTO: 1.7 % (ref 0.3–6.2)
ERYTHROCYTE [DISTWIDTH] IN BLOOD BY AUTOMATED COUNT: 12.6 % (ref 12.3–15.4)
GLOBULIN UR ELPH-MCNC: 2.9 GM/DL
GLUCOSE SERPL-MCNC: 84 MG/DL (ref 65–99)
HBA1C MFR BLD: 5.4 % (ref 4.8–5.6)
HCT VFR BLD AUTO: 42.5 % (ref 34–46.6)
HDLC SERPL-MCNC: 47 MG/DL (ref 40–60)
HGB BLD-MCNC: 14.3 G/DL (ref 12–15.9)
IMM GRANULOCYTES # BLD AUTO: 0.03 10*3/MM3 (ref 0–0.05)
IMM GRANULOCYTES NFR BLD AUTO: 0.4 % (ref 0–0.5)
LDLC SERPL CALC-MCNC: 138 MG/DL (ref 0–100)
LDLC/HDLC SERPL: 2.91 {RATIO}
LYMPHOCYTES # BLD AUTO: 2.59 10*3/MM3 (ref 0.7–3.1)
LYMPHOCYTES NFR BLD AUTO: 31.5 % (ref 19.6–45.3)
MCH RBC QN AUTO: 29.7 PG (ref 26.6–33)
MCHC RBC AUTO-ENTMCNC: 33.6 G/DL (ref 31.5–35.7)
MCV RBC AUTO: 88.2 FL (ref 79–97)
MONOCYTES # BLD AUTO: 0.33 10*3/MM3 (ref 0.1–0.9)
MONOCYTES NFR BLD AUTO: 4 % (ref 5–12)
NEUTROPHILS NFR BLD AUTO: 5.08 10*3/MM3 (ref 1.7–7)
NEUTROPHILS NFR BLD AUTO: 61.8 % (ref 42.7–76)
NRBC BLD AUTO-RTO: 0 /100 WBC (ref 0–0.2)
PLATELET # BLD AUTO: 340 10*3/MM3 (ref 140–450)
PMV BLD AUTO: 10 FL (ref 6–12)
POTASSIUM SERPL-SCNC: 5 MMOL/L (ref 3.5–5.2)
PROT SERPL-MCNC: 7.3 G/DL (ref 6–8.5)
RBC # BLD AUTO: 4.82 10*6/MM3 (ref 3.77–5.28)
SODIUM SERPL-SCNC: 139 MMOL/L (ref 136–145)
TRIGL SERPL-MCNC: 82 MG/DL (ref 0–150)
TSH SERPL DL<=0.05 MIU/L-ACNC: 2.02 UIU/ML (ref 0.27–4.2)
VLDLC SERPL-MCNC: 15 MG/DL (ref 5–40)
WBC NRBC COR # BLD AUTO: 8.22 10*3/MM3 (ref 3.4–10.8)

## 2024-12-03 PROCEDURE — 83036 HEMOGLOBIN GLYCOSYLATED A1C: CPT

## 2024-12-03 PROCEDURE — 36415 COLL VENOUS BLD VENIPUNCTURE: CPT

## 2024-12-03 PROCEDURE — 80061 LIPID PANEL: CPT

## 2024-12-03 PROCEDURE — 80050 GENERAL HEALTH PANEL: CPT

## 2024-12-06 ENCOUNTER — TELEPHONE (OUTPATIENT)
Dept: FAMILY MEDICINE CLINIC | Facility: CLINIC | Age: 54
End: 2024-12-06
Payer: COMMERCIAL

## 2024-12-06 DIAGNOSIS — Z12.11 COLON CANCER SCREENING: Primary | ICD-10-CM

## 2024-12-06 NOTE — TELEPHONE ENCOUNTER
Reviewed pts chart. Pt had a colonoscopy Dec 2021 and was to repeat in 3 years. Can we put in referral?     Pt aware and would like Jodie.

## 2024-12-26 DIAGNOSIS — Z86.0109 PERSONAL HISTORY OF OTHER COLON POLYPS: ICD-10-CM

## 2024-12-26 DIAGNOSIS — Z12.11 COLON CANCER SCREENING: Primary | ICD-10-CM

## 2024-12-26 DIAGNOSIS — Z80.0 FAMILY HISTORY OF COLON CANCER IN FATHER: ICD-10-CM

## 2025-02-13 ENCOUNTER — PREP FOR SURGERY (OUTPATIENT)
Dept: OTHER | Facility: HOSPITAL | Age: 55
End: 2025-02-13
Payer: COMMERCIAL

## 2025-02-13 ENCOUNTER — TELEPHONE (OUTPATIENT)
Dept: GASTROENTEROLOGY | Facility: CLINIC | Age: 55
End: 2025-02-13
Payer: COMMERCIAL

## 2025-02-13 DIAGNOSIS — Z12.11 ENCOUNTER FOR SCREENING FOR MALIGNANT NEOPLASM OF COLON: Primary | ICD-10-CM

## 2025-02-13 NOTE — TELEPHONE ENCOUNTER
Last colonoscopy 12/23/21    Personal hx polyps    Family hx polyps    Family hx of cx    ASA or blood thinners:  None    List medications:  Wegovy    OA form scanned in media

## 2025-02-18 ENCOUNTER — OFFICE VISIT (OUTPATIENT)
Dept: FAMILY MEDICINE CLINIC | Facility: CLINIC | Age: 55
End: 2025-02-18
Payer: COMMERCIAL

## 2025-02-18 VITALS
HEIGHT: 65 IN | OXYGEN SATURATION: 99 % | SYSTOLIC BLOOD PRESSURE: 99 MMHG | HEART RATE: 85 BPM | BODY MASS INDEX: 27.99 KG/M2 | WEIGHT: 168 LBS | DIASTOLIC BLOOD PRESSURE: 48 MMHG

## 2025-02-18 DIAGNOSIS — R05.1 ACUTE COUGH: Primary | ICD-10-CM

## 2025-02-18 DIAGNOSIS — E66.3 OVERWEIGHT (BMI 25.0-29.9): ICD-10-CM

## 2025-02-18 LAB
EXPIRATION DATE: NORMAL
FLUAV AG UPPER RESP QL IA.RAPID: NOT DETECTED
FLUBV AG UPPER RESP QL IA.RAPID: NOT DETECTED
INTERNAL CONTROL: NORMAL
Lab: NORMAL
SARS-COV-2 AG UPPER RESP QL IA.RAPID: NOT DETECTED

## 2025-02-18 PROCEDURE — 99214 OFFICE O/P EST MOD 30 MIN: CPT | Performed by: NURSE PRACTITIONER

## 2025-02-18 PROCEDURE — 87428 SARSCOV & INF VIR A&B AG IA: CPT | Performed by: NURSE PRACTITIONER

## 2025-02-18 RX ORDER — METHYLPREDNISOLONE 4 MG/1
TABLET ORAL
Qty: 1 EACH | Refills: 0 | Status: SHIPPED | OUTPATIENT
Start: 2025-02-18

## 2025-02-18 RX ORDER — SEMAGLUTIDE 2.4 MG/.75ML
2.4 INJECTION, SOLUTION SUBCUTANEOUS WEEKLY
Qty: 3 ML | Refills: 2 | Status: SHIPPED | OUTPATIENT
Start: 2025-02-18

## 2025-02-18 NOTE — PROGRESS NOTES
Chief Complaint  Obesity and Cough    SUBJECTIVE  Ronna Bajwa presents to [facility] for three month follow up on Wegovy. Pt states she is tolerating well and would like to discuss increasing the dose.     Starting weight 24 198 lbs   Last weight 24 166 lbs   Current weight 168 lbs    Pt recently came home from a trip to [facility].     Pt also complaining of a cough that has been present for over a week. States feels like possible bronchitis, states feels deep in her chest, feels congested, no shortness of breath, cough is nonproductive, no fever    History of Present Illness  Past Medical History:   Diagnosis Date    Anemia     History of transfusion     Hyperlipidemia     Obesity       Family History   Problem Relation Age of Onset    Diabetes Mother     Heart disease Mother     Alzheimer's disease Father     Parkinsonism Father     Colon cancer Father     Heart disease Father     Colon polyps Father     Cancer Sister     Heart disease Sister     Cancer Maternal Grandmother     Birth defects Son          at birth    Miscarriages / Stillbirths Son     Malig Hyperthermia Neg Hx       Past Surgical History:   Procedure Laterality Date    ABDOMINAL SURGERY  ,     BARIATRIC SURGERY  ,     SECTION      x2    COLONOSCOPY  approx     negative per pt     COLONOSCOPY N/A 2021    Procedure: COLONOSCOPY to cecum with biopsies with hot snare polypectomy;  Surgeon: Abdelrahman Gresham MD;  Location: [facility];  Service: Gastroenterology;  Laterality: N/A;  Pre: family h/x colon cancer, change in bowel habits  Post: polyp, hemorrhoids    ENDOMETRIAL ABLATION      LAPAROSCOPIC GASTRIC BANDING      and removal, and replacement    LAPAROSCOPY HYSTEROSCOPY ENDOMETRIAL ABLATION      LASIK      TUBAL ABDOMINAL LIGATION      UPPER GASTROINTESTINAL ENDOSCOPY  approx     negative per pt         Current Outpatient Medications:      "methylPREDNISolone (MEDROL) 4 MG dose pack, Take as directed on package instructions., Disp: 1 each, Rfl: 0    Semaglutide-Weight Management (Wegovy) 2.4 MG/0.75ML solution auto-injector, Inject 0.75 mL under the skin into the appropriate area as directed 1 (One) Time Per Week., Disp: 3 mL, Rfl: 2    OBJECTIVE  Vital Signs:   BP 99/48   Pulse 85   Ht 165.1 cm (65\")   Wt 76.2 kg (168 lb)   SpO2 99%   BMI 27.96 kg/m²    Estimated body mass index is 27.96 kg/m² as calculated from the following:    Height as of this encounter: 165.1 cm (65\").    Weight as of this encounter: 76.2 kg (168 lb).     Wt Readings from Last 3 Encounters:   02/18/25 76.2 kg (168 lb)   11/11/24 75.3 kg (166 lb)   07/17/24 82.1 kg (181 lb)     BP Readings from Last 3 Encounters:   02/18/25 99/48   11/11/24 106/73   07/17/24 100/48       Physical Exam  Vitals reviewed.   Constitutional:       Appearance: Normal appearance. She is well-developed.   HENT:      Head: Normocephalic and atraumatic.      Right Ear: External ear normal.      Left Ear: External ear normal.   Eyes:      Conjunctiva/sclera: Conjunctivae normal.      Pupils: Pupils are equal, round, and reactive to light.   Cardiovascular:      Rate and Rhythm: Normal rate and regular rhythm.      Heart sounds: No murmur heard.     No friction rub. No gallop.   Pulmonary:      Effort: Pulmonary effort is normal.      Breath sounds: Normal breath sounds. No wheezing or rhonchi.   Skin:     General: Skin is warm and dry.   Neurological:      Mental Status: She is alert and oriented to person, place, and time.      Cranial Nerves: No cranial nerve deficit.   Psychiatric:         Mood and Affect: Mood and affect normal.         Behavior: Behavior normal.         Thought Content: Thought content normal.         Judgment: Judgment normal.          Result Review    [facility]          3/9/2024    10:33 12/3/2024    10:09   CMP   Glucose 99  84    BUN 13  16    Creatinine 1.09  1.14    EGFR 60.5  57.0 "    Sodium 141  139    Potassium 4.4  5.0    Chloride 107  106    Calcium 9.5  9.9    Total Protein  7.3    Albumin  4.4    Globulin  2.9    Total Bilirubin  0.4    Alkaline Phosphatase  98    AST (SGOT)  18    ALT (SGPT)  10    Albumin/Globulin Ratio  1.5    BUN/Creatinine Ratio 11.9  14.0    Anion Gap 8.0  8.3      CBC          12/3/2024    10:09   CBC   WBC 8.22    RBC 4.82    Hemoglobin 14.3    Hematocrit 42.5    MCV 88.2    MCH 29.7    MCHC 33.6    RDW 12.6    Platelets 340      Lipid Panel          12/3/2024    10:09   Lipid Panel   Total Cholesterol 200    Triglycerides 82    HDL Cholesterol 47    VLDL Cholesterol 15    LDL Cholesterol  138    LDL/HDL Ratio 2.91      TSH          12/3/2024    10:09   TSH   TSH 2.020        No Images in the past 120 days found..     The above data has been reviewed by ASHLEY Harden 02/18/2025 11:41 EST.          Patient Care Team:  Lolita Montero APRN as PCP - General (Nurse Practitioner)  Naida Paredes MD as Consulting Physician (Obstetrics and Gynecology)            ASSESSMENT & PLAN    Diagnoses and all orders for this visit:    1. Acute cough (Primary)  Comments:  possible mild bronchitits, we will tx with MDP, f/u if no improvment  Orders:  -     POCT SARS-CoV-2 Antigen CONSTANTINO + Flu  -     methylPREDNISolone (MEDROL) 4 MG dose pack; Take as directed on package instructions.  Dispense: 1 each; Refill: 0    2. Overweight (BMI 25.0-29.9)  Assessment & Plan:  Patient has done very well with Wegovy thus far, however has sent a standstill in her weight loss journey, after discussion we have decided to go ahead and trial increasing Wegovy to 2.4 mg, patient reports she does still have some occasional issues with diarrhea off and on since starting the Wegovy, we did discuss that increasing the dose could potentially increase this side effect.  Patient will monitor and follow-up if needed, otherwise we will follow-up in 3 months    Orders:  -      Semaglutide-Weight Management (Wegovy) 2.4 MG/0.75ML solution auto-injector; Inject 0.75 mL under the skin into the appropriate area as directed 1 (One) Time Per Week.  Dispense: 3 mL; Refill: 2         Tobacco Use: Low Risk  (2/18/2025)    Patient History     Smoking Tobacco Use: Never     Smokeless Tobacco Use: Never     Passive Exposure: Not on file       Follow Up     Return in about 3 months (around 5/18/2025), or if symptoms worsen or fail to improve.        Patient was given instructions and counseling regarding her condition or for health maintenance advice. Please see specific information pulled into the AVS if appropriate.   I have reviewed information obtained and documented by others and I have confirmed the accuracy of this documented note.    ASHLEY Harden

## 2025-02-18 NOTE — ASSESSMENT & PLAN NOTE
Patient has done very well with Wegovy thus far, however has sent a standstill in her weight loss journey, after discussion we have decided to go ahead and trial increasing Wegovy to 2.4 mg, patient reports she does still have some occasional issues with diarrhea off and on since starting the Wegovy, we did discuss that increasing the dose could potentially increase this side effect.  Patient will monitor and follow-up if needed, otherwise we will follow-up in 3 months

## 2025-02-20 ENCOUNTER — PATIENT MESSAGE (OUTPATIENT)
Dept: FAMILY MEDICINE CLINIC | Facility: CLINIC | Age: 55
End: 2025-02-20
Payer: COMMERCIAL

## 2025-04-21 ENCOUNTER — OFFICE VISIT (OUTPATIENT)
Dept: FAMILY MEDICINE CLINIC | Facility: CLINIC | Age: 55
End: 2025-04-21
Payer: COMMERCIAL

## 2025-04-21 ENCOUNTER — HOSPITAL ENCOUNTER (OUTPATIENT)
Dept: GENERAL RADIOLOGY | Facility: HOSPITAL | Age: 55
Discharge: HOME | End: 2025-04-21
Admitting: NURSE PRACTITIONER
Payer: COMMERCIAL

## 2025-04-21 VITALS
SYSTOLIC BLOOD PRESSURE: 113 MMHG | DIASTOLIC BLOOD PRESSURE: 64 MMHG | WEIGHT: 169 LBS | OXYGEN SATURATION: 100 % | BODY MASS INDEX: 28.16 KG/M2 | HEIGHT: 65 IN | HEART RATE: 77 BPM

## 2025-04-21 DIAGNOSIS — R05.9 COUGH, UNSPECIFIED TYPE: Primary | ICD-10-CM

## 2025-04-21 DIAGNOSIS — R05.9 COUGH, UNSPECIFIED TYPE: ICD-10-CM

## 2025-04-21 DIAGNOSIS — E66.3 OVERWEIGHT (BMI 25.0-29.9): ICD-10-CM

## 2025-04-21 PROCEDURE — 71046 X-RAY EXAM CHEST 2 VIEWS: CPT

## 2025-04-21 RX ORDER — FAMOTIDINE 20 MG/1
20 TABLET, FILM COATED ORAL NIGHTLY
Qty: 30 TABLET | Refills: 0 | Status: SHIPPED | OUTPATIENT
Start: 2025-04-21

## 2025-04-21 NOTE — PROGRESS NOTES
Chief Complaint  Cough    SUBJECTIVE  Ronna Bajwa presents to [facility] due to lingering cough. Pt also has a pre employment form needing to be filled out.     Patient's cough started in February when she had acute bronchitis, was treated with a Medrol Dosepak, cough improved, but has not completely resolved.    Patient does admit to some reflux symptoms from time to time    Pt scheduled for colonoscopy in May     History of Present Illness  Past Medical History:   Diagnosis Date    Anemia     History of transfusion     Hyperlipidemia     Obesity       Family History   Problem Relation Age of Onset    Diabetes Mother     Heart disease Mother     Alzheimer's disease Father     Parkinsonism Father     Colon cancer Father     Heart disease Father     Colon polyps Father     Cancer Sister     Heart disease Sister     Cancer Maternal Grandmother     Birth defects Son          at birth    Miscarriages / Stillbirths Son     Malig Hyperthermia Neg Hx       Past Surgical History:   Procedure Laterality Date    ABDOMINAL SURGERY  ,     BARIATRIC SURGERY  ,     SECTION      x2    COLONOSCOPY  approx     negative per pt     COLONOSCOPY N/A 2021    Procedure: COLONOSCOPY to cecum with biopsies with hot snare polypectomy;  Surgeon: Abdelrahman Gresham MD;  Location: [facility];  Service: Gastroenterology;  Laterality: N/A;  Pre: family h/x colon cancer, change in bowel habits  Post: polyp, hemorrhoids    ENDOMETRIAL ABLATION      LAPAROSCOPIC GASTRIC BANDING      and removal, and replacement    LAPAROSCOPY HYSTEROSCOPY ENDOMETRIAL ABLATION      LASIK      TUBAL ABDOMINAL LIGATION      UPPER GASTROINTESTINAL ENDOSCOPY  approx     negative per pt         Current Outpatient Medications:     Semaglutide-Weight Management (Wegovy) 2.4 MG/0.75ML solution auto-injector, Inject 0.75 mL under the skin into the appropriate area as directed 1 (One)  "Time Per Week., Disp: 3 mL, Rfl: 2    famotidine (Pepcid) 20 MG tablet, Take 1 tablet by mouth Every Night., Disp: 30 tablet, Rfl: 0    OBJECTIVE  Vital Signs:   /64   Pulse 77   Ht 165.1 cm (65\")   Wt 76.7 kg (169 lb)   SpO2 100%   BMI 28.12 kg/m²    Estimated body mass index is 28.12 kg/m² as calculated from the following:    Height as of this encounter: 165.1 cm (65\").    Weight as of this encounter: 76.7 kg (169 lb).     Wt Readings from Last 3 Encounters:   04/21/25 76.7 kg (169 lb)   02/18/25 76.2 kg (168 lb)   11/11/24 75.3 kg (166 lb)     BP Readings from Last 3 Encounters:   04/21/25 113/64   02/18/25 99/48   11/11/24 106/73       Physical Exam  Vitals reviewed.   Constitutional:       Appearance: Normal appearance. She is well-developed.   HENT:      Head: Normocephalic and atraumatic.      Right Ear: External ear normal.      Left Ear: External ear normal.   Eyes:      Conjunctiva/sclera: Conjunctivae normal.      Pupils: Pupils are equal, round, and reactive to light.   Cardiovascular:      Rate and Rhythm: Normal rate and regular rhythm.      Heart sounds: No murmur heard.     No friction rub. No gallop.   Pulmonary:      Effort: Pulmonary effort is normal.      Breath sounds: Normal breath sounds. No wheezing or rhonchi.   Skin:     General: Skin is warm and dry.   Neurological:      Mental Status: She is alert and oriented to person, place, and time.      Cranial Nerves: No cranial nerve deficit.   Psychiatric:         Mood and Affect: Mood and affect normal.         Behavior: Behavior normal.         Thought Content: Thought content normal.         Judgment: Judgment normal.          Result Review    CMP          12/3/2024    10:09   CMP   Glucose 84    BUN 16    Creatinine 1.14    EGFR 57.0    Sodium 139    Potassium 5.0    Chloride 106    Calcium 9.9    Total Protein 7.3    Albumin 4.4    Globulin 2.9    Total Bilirubin 0.4    Alkaline Phosphatase 98    AST (SGOT) 18    ALT (SGPT) 10  "   Albumin/Globulin Ratio 1.5    BUN/Creatinine Ratio 14.0    Anion Gap 8.3      CBC          12/3/2024    10:09   CBC   WBC 8.22    RBC 4.82    Hemoglobin 14.3    Hematocrit 42.5    MCV 88.2    MCH 29.7    MCHC 33.6    RDW 12.6    Platelets 340      Lipid Panel          12/3/2024    10:09   Lipid Panel   Total Cholesterol 200    Triglycerides 82    HDL Cholesterol 47    VLDL Cholesterol 15    LDL Cholesterol  138    LDL/HDL Ratio 2.91      TSH          12/3/2024    10:09   TSH   TSH 2.020        No Images in the past 120 days found..     The above data has been reviewed by ASHLEY Harden 04/21/2025 07:57 EDT.          Patient Care Team:  Lolita Montero APRN as PCP - General (Nurse Practitioner)  Naida Paredes MD as Consulting Physician (Obstetrics and Gynecology)            ASSESSMENT & PLAN    Diagnoses and all orders for this visit:    1. Cough, unspecified type (Primary)  Assessment & Plan:  Cough x 2 months, possibly a bit most in the mornings, patient does have some intermittent reflux symptoms, we are going to trial starting Pepcid nightly, discussed with patient if no improvement after 2 to 3 weeks that the cough will need further evaluation.  We are also obtain chest x-ray today    Orders:  -     XR Chest 2 View; Future  -     famotidine (Pepcid) 20 MG tablet; Take 1 tablet by mouth Every Night.  Dispense: 30 tablet; Refill: 0    2. Overweight (BMI 25.0-29.9)  Assessment & Plan:  Patient is doing well with the increased dose of Wegovy, continues to have successful weight loss, BMI now at 28, we will continue current medication           Tobacco Use: Low Risk  (4/21/2025)    Patient History     Smoking Tobacco Use: Never     Smokeless Tobacco Use: Never     Passive Exposure: Not on file       Follow Up     Return if symptoms worsen or fail to improve.        Patient was given instructions and counseling regarding her condition or for health maintenance advice. Please see specific  information pulled into the AVS if appropriate.   I have reviewed information obtained and documented by others and I have confirmed the accuracy of this documented note.    Lolita Montero APRN

## 2025-04-22 ENCOUNTER — RESULTS FOLLOW-UP (OUTPATIENT)
Dept: FAMILY MEDICINE CLINIC | Facility: CLINIC | Age: 55
End: 2025-04-22
Payer: COMMERCIAL

## 2025-04-23 PROBLEM — R05.9 COUGH: Status: ACTIVE | Noted: 2025-04-23

## 2025-04-23 NOTE — ASSESSMENT & PLAN NOTE
Cough x 2 months, possibly a bit most in the mornings, patient does have some intermittent reflux symptoms, we are going to trial starting Pepcid nightly, discussed with patient if no improvement after 2 to 3 weeks that the cough will need further evaluation.  We are also obtain chest x-ray today

## 2025-04-23 NOTE — ASSESSMENT & PLAN NOTE
Patient is doing well with the increased dose of Wegovy, continues to have successful weight loss, BMI now at 28, we will continue current medication

## 2025-04-30 NOTE — PROGRESS NOTES
Chief Complaint  Consult (Consult panniculectomy)    Subjective               HPI     Patient is a 54 y.o. female with history of morbid obesity and pre diabetes, anemia status post Laparoscopic Band on  and then revision in  and subsequent massive weight loss who presents today for evaluation for possible panniculectomy. Patient used to weigh as much as 250 pounds.  After  procedure patient successfully lost over 100 pounds and most recent weight has been 163 pounds.  Her weight has been stable for 6 months.  Patient is happy with  her weight and does not plan to lose more weight.     She has been on wegovy since 2024 and her dose was just recently increased to 2.5 from 1.7 because she gained a couple of pounds and wanted to make sure she is able to maintain her current weight.     As a result of massive weight loss patient has excess skin throughout the body.  Patient is primarily bothered by excess skin in the abdomen with recurrent rashes under the pannus.  Patient denies active rash under the pannus at this time. Patient is meticulous about hygiene.  Due to size of the pannus patient has difficulty with exercise, bending down, difficulty finding appropriately fitting clothing. Patient denies abdominal pain or hernias.  Denies GERD, but had scratchy throat and was recently started on Pepcid.  Patient denies nausea or vomiting. Patient denies diarrhea or constipation.  Most recent hemoglobin A1c was 5.4 on 12/3/2024.      Most recent lab work on 2024: Cr 1.14, otherwise CBC and BMP wnl.  Patient is on the following over the counter supplements: none .   Patient is  not currently on blood thinners.   Patient exercises regularly by walking.      Patient has a history of 5 pregnancies and 5 live birth (1 set of twins).  Patient has a history  the following surgeries in the past:  x2 , bariatric surgery.       Patient has never had DVT/PE in the past. Patient is currently not smoking, vaping  or chewing tobacco. No marijuana use.      Patient denies chest pain or shortness of breath. Patient denies history of MI, kidney disease or lung problems.  Denies unintentional weight loss, night sweats or chills.  She has upcoming mammogram. She does regular breast exam and denies any concerning breast masses, skin retraction or nipple drainage.   Patient denies history of excessive bleeding or blood clots, denies personal history of adverse anesthesia reactions. Denies excessive postsurgical nausea.     The following people will take care of the patient after surgery:  and daughter (nurse).       Allergies: Patient has no known allergies.  Allergies Reconciled.    Review of Systems  All system were reviewed and were negative, except the ones noted above.     Past Medical History:   Diagnosis Date    Anemia     History of transfusion     Hyperlipidemia     Obesity       Past Surgical History:   Procedure Laterality Date    ABDOMINAL SURGERY  ,     BARIATRIC SURGERY  ,     SECTION      x2    COLONOSCOPY  approx     negative per pt     COLONOSCOPY N/A 2021    Procedure: COLONOSCOPY to cecum with biopsies with hot snare polypectomy;  Surgeon: Abdelrahman Gresham MD;  Location: [facility];  Service: Gastroenterology;  Laterality: N/A;  Pre: family h/x colon cancer, change in bowel habits  Post: polyp, hemorrhoids    ENDOMETRIAL ABLATION      LAPAROSCOPIC GASTRIC BANDING      and removal, and replacement    LAPAROSCOPY HYSTEROSCOPY ENDOMETRIAL ABLATION      LASIK      TUBAL ABDOMINAL LIGATION      UPPER GASTROINTESTINAL ENDOSCOPY  approx     negative per pt       Family History   Problem Relation Age of Onset    Diabetes Mother     Heart disease Mother     Alzheimer's disease Father     Parkinsonism Father     Colon cancer Father     Heart disease Father     Colon polyps Father     Cancer Sister     Heart disease Sister     Cancer Maternal Grandmother     Birth  "defects Son          at birth    Miscarriages / Stillbirths Son     Mike Hyperthermia Neg Hx     . No family history of blood clots or excessive bleeding.  No history of adverse anesthesia reactions.  Social History     Social History Narrative    Not on file      Tobacco Use: Low Risk  (2025)    Patient History     Smoking Tobacco Use: Never     Smokeless Tobacco Use: Never     Passive Exposure: Not on file      Social history:  , recently retired as a teacher, no smoking, alcohol or drug use.     Objective     /72 (BP Location: Right arm, Patient Position: Sitting, Cuff Size: Adult)   Pulse 65   Ht 165.1 cm (65\")   Wt 75.3 kg (166 lb)   SpO2 97%   BMI 27.62 kg/m²     Body mass index is 27.62 kg/m².    Physical Exam      General: pleasant woman In NAD, well nourished  CV: RR  Lungs: CTAB, no wheezing   Breast: deferred   Abdomen: Soft, nontender, nondistended, excess skin above and below umbilicus with large pannus hanging over mons pubis, mons ptosis noted, excess skin in vertical and horizontal direction, but more so in the vertical direction, extensive striae, no active rash on exam, stretched out umbilical stalk covered in a skin fold, several well-healed scars from prior laparoscopic surgery, palpable diastases recti above umbilicus and abdominal fascial weakness below umbilicus, but no palpable hernias, no bilateral lateral flank soft tissue fullness and minimal excess skin, umbilical vertical scar from prior  with scar tethering, RUQ lap band port palpable and subcutaneous tissue just to the right of the midline  Measurements: at umbilicus level 41 inches, lower abdomen at the level of the pannus 46 inches  Extremities: No peripheral edema  Psych: Appropriate mood and affect  Neuro: Grossly intact          Result Review :       Most recent lab work was in 2024 notable for creatinine of 1.14.  I reviewed several of her prior BMP results noted has shown her " creatinine has been above 1 for quite some time.  Patient recently had chest x-ray on 2025 with no concerning findings.  She has not had recent EKG.    Procedures              Diagnoses and all orders for this visit:    1. Excessive and redundant skin and subcutaneous tissue (Primary)    2. Rash    3. Scar        Plan:  Patient is a 54 woman with history of morbid obesity status post laparoscopic gastric band  and revision in  and subsequent massive weight loss.  She has been stable at her current weight for over 6 months.    On physical exam patient has excess skin above and below umbilicus with no active rash under her pannus.  She has palpable diastases above umbilicus and lower abdominal wall weakness, but no palpable hernias. Umbilical stalk is very stretched out.  She has infraumbilical vertical scar with tethering from prior .  She has palpable right upper quadrant subcutaneous Lap-Band port just to the right of the midline. Patient has mons ptosis with excess skin.     Patient will benefit from panniculectomy to decrease the risk of recurrent rashes.   I discussed with the patient the difference between [id number] panniculectomy and lower transverse abdominal panniculectomy.      [id number]-[id number] panniculectomy allows for excision of excess skin above and below umbilicus in vertical and horizontal direction.  The downside of this procedure is high risk of wound dehiscence at the T-junction.  Normally this does not require reoperation and is allowed to heal by secondary intention but healing can take several weeks to months.  It also results in increased scar burden with vertical and horizontal extent.      I discussed with her the details of lower transverse abdominal panniculectomy.  This approach only addresses excess skin and soft tissue below umbilicus and will not be a good operation for her as it will not address excess tissue above umbilicus.     If she would like to minimize  her scar burden then I would recommend lower transverse abdominal panniculectomy with umbilical transposition and diastases repair .     Lower transverse abdominal panniculectomy approach benefit is that it does decrease the scar burden and once she is healed it should be hidden below her clothing.  I also stressed the importance that lower transverse abdominal panniculectomy also carries a risk of delayed wound healing, but it is less so than [id number] panniculectomy.    Given the position of Lap-Band port patient will need to be evaluated by bariatric surgeon for band repositioning or removal.     The risks of both procedures include but not limited to bleeding, seroma, infection, scar, sensation changes, damage to deeper structures, asymmetry, contour irregularities, partial or complete umbilical loss, delayed wound healing, dog ears and unsatisfactory results.     We also discussed 6 weeks of postoperative recovery.       Plan:     --Preoperative photos obtained today  -- I do not think she needs any additional weight loss, a lot of her current weight is excess skin and subcu tissue after massive weight loss  -- Surgical options discussed above: [id number] panniculectomy versus lower transverse abdominal panniculectomy plus diastases repair and umbilical transposition   -- Discussed that if I encountered any hernias at the time of panniculectomy I will plan to repair those at the same time.    -- Wegovy: Patient is currently on Wegovy.  We discussed that she will need to hold this medication for 2 weeks before surgery and 2 weeks after surgery  --She denied any kidney disease however on the review of her lab work she is noted to have chronically elevated creatinine.   -- She is currently on Pepcid.  -- Patient recently had chest x-ray in April 2025, last labs were in December 2024.  Patient will need full set of labs including nutritional labs and nicotine testing prior to surgery  -- We discussed the use  of Lovenox for DVT prophylaxis  -- Patient has upcoming mammogram  -- We discussed expected 6-week postoperative recovery period, walking hunched over for 2 weeks, sleeping in beach chair or recliner position for 6 weeks, compression garment drain use, tentative follow-up plan.    -- I will refer patient to Dr. Rasmussen and Dr. Whitfield for evaluation for possible lap band repositioning or removal  --I do not feel any hernias on exam and I will hold off on obtaining any CT scans for now  --Patient will consider her options and let me know how she would like to proceed            I spent 60 minutes caring for this patient including face-to-face interaction, performing physical exam, reviewing lab work and imaging, communicating with other providers, and documenting this visit.    Follow Up     No follow-ups on file.    Patient was given instructions and counseling regarding her condition. Please see specific information pulled into the AVS if appropriate.     Shellie Turpin MD  05/05/2025

## 2025-05-05 ENCOUNTER — OFFICE VISIT (OUTPATIENT)
Dept: PLASTIC SURGERY | Facility: CLINIC | Age: 55
End: 2025-05-05
Payer: COMMERCIAL

## 2025-05-05 VITALS
BODY MASS INDEX: 27.66 KG/M2 | DIASTOLIC BLOOD PRESSURE: 72 MMHG | HEART RATE: 65 BPM | HEIGHT: 65 IN | OXYGEN SATURATION: 97 % | SYSTOLIC BLOOD PRESSURE: 106 MMHG | WEIGHT: 166 LBS

## 2025-05-05 DIAGNOSIS — L98.7 EXCESSIVE AND REDUNDANT SKIN AND SUBCUTANEOUS TISSUE: Primary | ICD-10-CM

## 2025-05-05 DIAGNOSIS — R21 RASH: ICD-10-CM

## 2025-05-05 DIAGNOSIS — L90.5 SCAR: ICD-10-CM

## 2025-05-07 ENCOUNTER — PATIENT ROUNDING (BHMG ONLY) (OUTPATIENT)
Dept: PLASTIC SURGERY | Facility: CLINIC | Age: 55
End: 2025-05-07
Payer: COMMERCIAL

## 2025-05-07 ENCOUNTER — PATIENT MESSAGE (OUTPATIENT)
Dept: PLASTIC SURGERY | Facility: CLINIC | Age: 55
End: 2025-05-07
Payer: COMMERCIAL

## 2025-05-07 NOTE — PROGRESS NOTES
=Chief Complaint  Consult ([facility]BR)    Subjective          History of Present Illness  Ronna Bajwa is a 54 y.o. female with history of morbid obesity, pre diabetes, anemia status post Laparoscopic Band on 2006 and then revision in 2013 and subsequent massive weight loss and symptomatic macromastia who presents to [facility] as a consult from [unfilled] and would like to discuss breast reduction.      She was previously seen by me for evaluation for possible panniculectomy on May 5, 2025.  She has right-sided abdominal lap band port that would need to be repositioned prior to proceeding with panniculectomy.  The referral to Dr. Rasmussen and Dr. Whitfield has been placed but she has not been seen by them yet.    Patient used to weigh as much as 250 pounds.  After  procedure patient successfully lost over 100 pounds and most recent weight has been 163 pounds.  Her weight has been stable for 6 months.  Patient is happy with  her weight and does not plan to lose more weight.   Patient has had large breasts since she as a teenager.   She has been on wegovy since Feb 2024 and her dose was just recently increased to 2.5 from 1.7 because she gained a couple of pounds and wanted to make sure she is able to maintain her current weight.     As a result of her longstanding macromastia she has had neck pain, back pain, shoulder grooving and recurrent rashes under her breasts.  She is using ketoconazole cream under her breast to help control her rashes. She denies current rashes under her breasts. She is not as active as she would like to be due to her large breasts.   She has been going to massage therapist  that results in temporary relief. She has not tried PT or chiropractor.    She is having difficulty finding appropriately fitting clothing and appropriately fitting bras.    Her current bra size 42 DD and she would like to be a C.  Would like to be proportional.    She  has been compliant with her yearly mammograms and has another mammogram coming up in the next few weeks.  She has never had abnormal mammogram in the past. Patient states her breasts are heavy and large, but she denies any concerning masses, skin retraction or nipple drainage. She has never had breast biopsy.  She is not sure if 1 breast is larger than the other.  She reports normal sensation in both nipples.      Denies GERD, but had scratchy throat and was recently started on Pepcid.  Patient denies nausea or vomiting. Patient denies diarrhea or constipation.  Most recent hemoglobin A1c was 5.4 on 12/3/2024.       Most recent lab work on 2024: Cr 1.14, otherwise CBC and BMP wnl.  Patient is on the following over the counter supplements: none .   Patient is  not currently on blood thinners.   Patient exercises regularly by walking.      Patient has a history of 5 pregnancies and 5 live birth (1 set of twins).  She breast-fed one of her children for several months  Patient has a history  the following surgeries in the past:  x2 , bariatric surgery.       Patient has never had DVT/PE in the past. Patient is currently not smoking, vaping or chewing tobacco. No marijuana use.      Patient denies chest pain or shortness of breath. Patient denies history of MI, kidney disease or lung problems.  Denies unintentional weight loss, night sweats or chills.  She has upcoming mammogram. She does regular breast exam and denies any concerning breast masses, skin retraction or nipple drainage.   Patient denies history of excessive bleeding or blood clots, denies personal history of adverse anesthesia reactions. Denies excessive postsurgical nausea.     The following people will take care of the patient after surgery:  and daughter (nurse).       Allergies: Patient has no known allergies.  Allergies Reconciled.    Review of Systems  All system were reviewed and were negative, except the ones noted above.      Past Medical History:   Diagnosis Date    Anemia     History of transfusion     Hyperlipidemia     Obesity       Past Surgical History:   Procedure Laterality Date    ABDOMINAL SURGERY  , 2013    BARIATRIC SURGERY  ,     SECTION      x2    COLONOSCOPY  approx     negative per pt     COLONOSCOPY N/A 2021    Procedure: COLONOSCOPY to cecum with biopsies with hot snare polypectomy;  Surgeon: Abdelrahman Gresham MD;  Location: [facility] END[facility];  Service: Gastroenterology;  Laterality: N/A;  Pre: family h/x colon cancer, change in bowel habits  Post: polyp, hemorrhoids    ENDOMETRIAL ABLATION      LAPAROSCOPIC GASTRIC BANDING      and removal, and replacement    LAPAROSCOPY HYSTEROSCOPY ENDOMETRIAL ABLATION      LASIK      TUBAL ABDOMINAL LIGATION      UPPER GASTROINTESTINAL ENDOSCOPY  approx     negative per pt       Family History   Problem Relation Age of Onset    Diabetes Mother     Heart disease Mother     Alzheimer's disease Father     Parkinsonism Father     Colon cancer Father     Heart disease Father     Colon polyps Father     Cancer Sister     Heart disease Sister     Cancer Maternal Grandmother     Birth defects Son          at birth    Miscarriages / Stillbirths Son     Malig Hyperthermia Neg Hx     . No family history of blood clots or excessive bleeding.  No history of adverse anesthesia reactions.   Social History     Tobacco Use    Smoking status: Never    Smokeless tobacco: Never   Vaping Use    Vaping status: Never Used   Substance Use Topics    Alcohol use: Not Currently    Drug use: Never      Tobacco Use: Low Risk  (2025)    Patient History     Smoking Tobacco Use: Never     Smokeless Tobacco Use: Never     Passive Exposure: Not on file      Social history:  , recently retired as a teacher, no smoking, alcohol or drug use.     Current Outpatient Medications on File Prior to Visit   Medication Sig Dispense Refill    famotidine (Pepcid) 20 MG  "tablet Take 1 tablet by mouth Every Night. 30 tablet 0    Semaglutide-Weight Management (Wegovy) 2.4 MG/0.75ML solution auto-injector Inject 0.75 mL under the skin into the appropriate area as directed 1 (One) Time Per Week. 3 mL 2     No current facility-administered medications on file prior to visit.         Caprini Score: 2     Each risk represents 1 point    Age 41-59    Minor surgery planned   History of major surgery (<1month)   Varicose veins   History of IBD   Swollen legs (current)   Obesity (BMI >30)   Acute Myocardial infarction   Congestive heart failure (<1 month)   Abnormal pulmonary function (COPD)   On bed rest  Total:   Each risk factor represents 2 points   Age 60-74   Arthroscopic surgery   Malignancy (present or  previous)   Major surgery (>45 min)   Laparoscopic surgery > 45 min   Patient confined to bed (> 72 hours)   Immobilizing plaster cast (< 1 month)   Central venous access      Total: 2  Each Risk factor represents 3 points:   Age > 75    History of DVT/PE   Family history of DVT/PE   Present Chemotherapy    Positive Factor V Leiden   Positive Prothrombin   Elevated homocysteine   Positive lupus anticoagulant   Elevated anticardiolipin antibodies   Heparin induces Thrombocytopenia (HIT)      Total:     For women (each represents 1 point)   Oral contraceptives or hormone replacement therapy   Pregnancy or postpartum (<1 month)   History of spontaneous  (>3)      Total:  Caprini Risk Category     Total score Category    0-4 Low   5-8 Moderate   >9 High     Each Risk Factor represents 5 points   Major surgery (> 6 hours)   Hip, pelvis or leg fracture (<1 month)    Stroke (< 1 month)   Multiple trauma (< 1 month)   Acute spinal cord injury or paralysis (<1 month)    Total:         Objective     /74 (BP Location: Left arm, Patient Position: Sitting, Cuff Size: Adult)   Pulse 82   Ht 165.1 cm (65\")   Wt 76.6 kg (168 lb 12.8 oz)   SpO2 96%   BMI 28.09 kg/m²     Body mass " index is 28.09 kg/m².    Physical Exam    General: pleasant woman in NAD  CV: RR  Lungs: CTAB, no wheezing  Breast exam: Bilateral large breasts with grade 3 ptosis with right breast slightly larger than the left, left nipple-areolar complex is larger and in higher position compared to the right, overall reasonable symmetry.  Some moisture under both breast, but no active rash. Bilateral breast with soft breast tissue, no concerning masses, skin retraction or nipple drainage.  Extensive striae , bilateral lateral chest soft tissue rolls   SN-N: R 35 cm, L 34.5 cm  IMF-N: R 13 cm, L 12 cm   Breast width: R 19 cm, L 18 cm   N-N: 19 cm  NAC: R 6.5 x 9 cm, L 8 x 8 cm  Chest circumference at IMF level: 39.5  inches  Lymph: No cervical, supraclavicular or axillary lymphadenopathy  Abdomen: Soft, nontender, nondistended, excess skin above and below umbilicus with large pannus hanging over mons pubis, mons ptosis noted, excess skin in vertical and horizontal direction, but more so in the vertical direction, extensive striae, no active rash on exam, stretched out umbilical stalk covered in a skin fold, several well-healed scars from prior laparoscopic surgery, palpable diastases recti above umbilicus and abdominal fascial weakness below umbilicus, but no palpable hernias, no bilateral lateral flank soft tissue fullness and minimal excess skin, umbilical vertical scar from prior  with scar tethering, RUQ lap band port palpable and subcutaneous tissue just to the right of the midline  Measurements: at umbilicus level 41 inches, lower abdomen at the level of the pannus 46 inches  Extremities: No peripheral edema, full range of motion  Psych: Appropriate mood and affect  Neuro: Grossly intact    Schnur Scale Score:  Body surface area is 1.84 meters squared.  450 g of tissue.     Result Review :   The following data was reviewed by: Shellie Turpin MD on 2025:    Most recent lab work was in 2024  notable for creatinine of 1.14.  I reviewed several of her prior BMP results noted has shown her creatinine has been above 1 for quite some time.  Patient recently had chest x-ray on April 21, 2025 with no concerning findings.  She has not had recent EKG.            Assessment and Plan      Diagnoses and all orders for this visit:    1. Macromastia (Primary)    2. Class 1 obesity without serious comorbidity with body mass index (BMI) of 30.0 to 30.9 in adult, unspecified obesity type  -     Ambulatory Referral to Bariatric Surgery    3. Cervicalgia    4. Bilateral shoulder pain, unspecified chronicity    5. Rash      Patient is a 54 woman with history of morbid obesity status post laparoscopic gastric band 2006 and revision in 2013 and subsequent massive weight loss.  She has been stable at her current weight for over 6 months.     She is here for evaluation for bilateral breast reduction.  Mammogram is up to date and she is due for another 1 in the next few weeks.    On physical exam patient has bilateral large pendulous breasts with grade 3 ptosis.  Both breasts are soft without any concerning palpable masses, skin retraction or nipple drainage.  Right breast is slightly larger than the left.  Overall she has fairly good symmetry.  No concerning lymphadenopathy.     Given patient's symptomatology patient would benefit from bilateral breast reduction mammoplasty.  Her BSA is 1.8  and per Schnur scale I recommend removing 450 g from each breast to help relieve her symptoms.    I discussed with the patient details and possible complications of the procedure including, but not limited to bleeding, seroma, infection, scar, partial or complete nipple loss, sensation changes which are usually temporary, but could be permanent, delayed wound healing, asymmetry, increased calcifications on mammogram and dog ears.     I will perform SPY angiography and if there are any concerns for NAC perfusion I will plan to preform free  nipple graft which means NAC will be permanently insensate.   She is aware any significant weight changes in the future may affect her long-term results.      Plan:  Pre-op photos obtained today  BSA 1.8:  plan to remove 450 g from each breast per Schnurr scale.  Her goal is to pursue panniculectomy first.  And I discussed with her that while some excess tissue was removed at the time of surgery this should not significantly change her weight or BSA and therefore unlikely to significantly change the amount of breast tissue that needs to be removed from each side to meet insurance criteria for breast reduction mammoplasty.  All specimens will be sent to pathology  Genetic testing not indicated   Was recent mammogram was in 2024, she has an upcoming mammogram in the next 2 weeks.pending on time and when we will proceed with surgery she was told recommendation to wait 6 months after breast surgery for next mammogram  I will follow-up on the results of her mammogram once she has a done.  If it is done at an outside facility she was asked to fax us the results.  She would like to pursue panniculectomy first.  Referral has been made to Dr. Rasmussen and Dr. Whitfield's office patient has not heard back from them yet.  We will follow-up on that to make sure she is seeing in the near future for lap band removal or repositioning.  Most recent lab work is from 12/2024. Pending when we proceed with surgery I will likely repeat full set of labs preoperatively including nicotine test  Closer to surgery I will obtain a preoperative EKG and chest x-ray  She was told she will have to hold Wegovy 2 weeks before and 2 weeks after surgery  Recommendation for weight stability for 6 months prior to body contouring procedures. Any future significant weight fluctuations will affect her long-term results  Chest circumference at [facility] level is 39.5 inches.  Was told she will have soft surgical bra and that she will need to pad her incisions under  her breast with ABD pads  We discussed postoperative 6-week recovery period and tentative follow-up plan  She was told about drain use, sleeping in recliner position, no lifting more than 10 pounds for 6 weeks, no strenuous activities for 6 weeks, no significant stretches pulls or pushes with her arms for at least 4 weeks  Follow-up will depend on when we proceed with panniculectomy first          I spent 45 minutes caring for Ronna on this date of service. This time includes time spent by me in the following activities:reviewing tests, obtaining and/or reviewing a separately obtained history, performing a medically appropriate examination and/or evaluation , counseling and educating the patient/family/caregiver, referring and communicating with other health care professionals , and documenting information in the medical record      Follow Up     No follow-ups on file.    Patient was given instructions and counseling regarding her condition. Please see specific information pulled into the AVS if appropriate.     Shellie Turpin MD  05/22/2025

## 2025-05-07 NOTE — PROGRESS NOTES
A [employer] message has been sent to the patient for PATIENT ROUNDING with [facility].

## 2025-05-14 ENCOUNTER — TELEPHONE (OUTPATIENT)
Dept: GASTROENTEROLOGY | Facility: CLINIC | Age: 55
End: 2025-05-14
Payer: COMMERCIAL

## 2025-05-14 NOTE — TELEPHONE ENCOUNTER
Caller: Ronna Bajwa    Relationship to patient: Self    Best call back number: [phone]    Chief complaint: CALLED INTO WORK    Type of visit: SCOPE    Requested date: FIRST AVAILABLE AFTER MAY 23RD    If rescheduling, when is the original appointment: 05.20.25     Additional notes:PT IS CALLING TO CANCEL/RESCHEDULE SCOPE. PT HAS BEEN CALLED INTO WORK THAT DAY. PLEASE CONTACT PT TO RESCHEDULE.

## 2025-05-22 ENCOUNTER — CONSULT (OUTPATIENT)
Dept: PLASTIC SURGERY | Facility: CLINIC | Age: 55
End: 2025-05-22
Payer: COMMERCIAL

## 2025-05-22 VITALS
DIASTOLIC BLOOD PRESSURE: 74 MMHG | HEIGHT: 65 IN | WEIGHT: 168.8 LBS | BODY MASS INDEX: 28.12 KG/M2 | OXYGEN SATURATION: 96 % | SYSTOLIC BLOOD PRESSURE: 113 MMHG | HEART RATE: 82 BPM

## 2025-05-22 DIAGNOSIS — M25.511 BILATERAL SHOULDER PAIN, UNSPECIFIED CHRONICITY: ICD-10-CM

## 2025-05-22 DIAGNOSIS — R21 RASH: ICD-10-CM

## 2025-05-22 DIAGNOSIS — M25.512 BILATERAL SHOULDER PAIN, UNSPECIFIED CHRONICITY: ICD-10-CM

## 2025-05-22 DIAGNOSIS — N62 MACROMASTIA: Primary | ICD-10-CM

## 2025-05-22 DIAGNOSIS — E66.811 CLASS 1 OBESITY WITHOUT SERIOUS COMORBIDITY WITH BODY MASS INDEX (BMI) OF 30.0 TO 30.9 IN ADULT, UNSPECIFIED OBESITY TYPE: ICD-10-CM

## 2025-05-22 DIAGNOSIS — M54.2 CERVICALGIA: ICD-10-CM

## 2025-05-22 NOTE — LETTER
May 22, 2025     Lolita Montero, ASHLEY  [address]  [address]  [place] [address]    Patient: Ronna Bajwa   YOB: 1970   Date of Visit: 5/22/2025     Dear ASHLEY Reynolds:       Thank you for referring Ronna Bajwa to me for evaluation. Below are the relevant portions of my assessment and plan of care.    If you have questions, please do not hesitate to call me. I look forward to following Ronna along with you.         Sincerely,        Shellie Turpin MD        CC: No Recipients    Shellie Turpin MD  05/22/25 1121  Sign when Signing Visit  =Chief Complaint  Consult (BBR)    Subjective         History of Present Illness  Ronna Bajwa is a 54 y.o. female with history of morbid obesity, pre diabetes, anemia status post Laparoscopic Band on 2006 and then revision in 2013 and subsequent massive weight loss and symptomatic macromastia who presents to [facility] as a consult from [unfilled] and would like to discuss breast reduction.      She was previously seen by me for evaluation for possible panniculectomy on May 5, 2025.  She has right-sided abdominal lap band port that would need to be repositioned prior to proceeding with panniculectomy.  The referral to Dr. Rasmussen and Dr. Whitfield has been placed but she has not been seen by them yet.    Patient used to weigh as much as 250 pounds.  After  procedure patient successfully lost over 100 pounds and most recent weight has been 163 pounds.  Her weight has been stable for 6 months.  Patient is happy with  her weight and does not plan to lose more weight.   Patient has had large breasts since she as a teenager.   She has been on wegovy since Feb 2024 and her dose was just recently increased to 2.5 from 1.7 because she gained a couple of pounds and wanted to make sure she is able to maintain her current weight.     As a result of her longstanding macromastia  she has had neck pain, back pain, shoulder grooving and recurrent rashes under her breasts.  She is using ketoconazole cream under her breast to help control her rashes. She denies current rashes under her breasts. She is not as active as she would like to be due to her large breasts.   She has been going to massage therapist  that results in temporary relief. She has not tried PT or chiropractor.    She is having difficulty finding appropriately fitting clothing and appropriately fitting bras.    Her current bra size 42 DD and she would like to be a C.  Would like to be proportional.    She has been compliant with her yearly mammograms and has another mammogram coming up in the next few weeks.  She has never had abnormal mammogram in the past. Patient states her breasts are heavy and large, but she denies any concerning masses, skin retraction or nipple drainage. She has never had breast biopsy.  She is not sure if 1 breast is larger than the other.  She reports normal sensation in both nipples.      Denies GERD, but had scratchy throat and was recently started on Pepcid.  Patient denies nausea or vomiting. Patient denies diarrhea or constipation.  Most recent hemoglobin A1c was 5.4 on 12/3/2024.       Most recent lab work on 2024: Cr 1.14, otherwise CBC and BMP wnl.  Patient is on the following over the counter supplements: none .   Patient is  not currently on blood thinners.   Patient exercises regularly by walking.      Patient has a history of 5 pregnancies and 5 live birth (1 set of twins).  She breast-fed one of her children for several months  Patient has a history  the following surgeries in the past:  x2 , bariatric surgery.       Patient has never had DVT/PE in the past. Patient is currently not smoking, vaping or chewing tobacco. No marijuana use.      Patient denies chest pain or shortness of breath. Patient denies history of MI, kidney disease or lung problems.  Denies unintentional  weight loss, night sweats or chills.  She has upcoming mammogram. She does regular breast exam and denies any concerning breast masses, skin retraction or nipple drainage.   Patient denies history of excessive bleeding or blood clots, denies personal history of adverse anesthesia reactions. Denies excessive postsurgical nausea.     The following people will take care of the patient after surgery:  and daughter (nurse).       Allergies: Patient has no known allergies.  Allergies Reconciled.    Review of Systems  All system were reviewed and were negative, except the ones noted above.     Past Medical History:   Diagnosis Date   • Anemia    • History of transfusion    • Hyperlipidemia    • Obesity       Past Surgical History:   Procedure Laterality Date   • ABDOMINAL SURGERY  ,    • BARIATRIC SURGERY  ,   •  SECTION      x2   • COLONOSCOPY  approx     negative per pt    • COLONOSCOPY N/A 2021    Procedure: COLONOSCOPY to cecum with biopsies with hot snare polypectomy;  Surgeon: Abdelrahman Gresham MD;  [facility] ENDOSCOPY;  Service: Gastroenterology;  Laterality: N/A;  Pre: family h/x colon cancer, change in bowel habits  Post: polyp, hemorrhoids   • ENDOMETRIAL ABLATION     • LAPAROSCOPIC GASTRIC BANDING      and removal, and replacement   • LAPAROSCOPY HYSTEROSCOPY ENDOMETRIAL ABLATION     • LASIK     • TUBAL ABDOMINAL LIGATION     • UPPER GASTROINTESTINAL ENDOSCOPY  approx     negative per pt       Family History   Problem Relation Age of Onset   • Diabetes Mother    • Heart disease Mother    • Alzheimer's disease Father    • Parkinsonism Father    • Colon cancer Father    • Heart disease Father    • Colon polyps Father    • Cancer Sister    • Heart disease Sister    • Cancer Maternal Grandmother    • Birth defects Son          at birth   • Miscarriages / Stillbirths Son    • Malig Hyperthermia Neg Hx     . No family history of blood clots or excessive  bleeding.  No history of adverse anesthesia reactions.   Social History     Tobacco Use   • Smoking status: Never   • Smokeless tobacco: Never   Vaping Use   • Vaping status: Never Used   Substance Use Topics   • Alcohol use: Not Currently   • Drug use: Never      Tobacco Use: Low Risk  (5/22/2025)    Patient History    • Smoking Tobacco Use: Never    • Smokeless Tobacco Use: Never    • Passive Exposure: Not on file      Social history:  , recently retired as a teacher, no smoking, alcohol or drug use.     Current Outpatient Medications on File Prior to Visit   Medication Sig Dispense Refill   • famotidine (Pepcid) 20 MG tablet Take 1 tablet by mouth Every Night. 30 tablet 0   • Semaglutide-Weight Management (Wegovy) 2.4 MG/0.75ML solution auto-injector Inject 0.75 mL under the skin into the appropriate area as directed 1 (One) Time Per Week. 3 mL 2     No current facility-administered medications on file prior to visit.         Caprini Score: 2     Each risk represents 1 point    Age 41-59    Minor surgery planned   History of major surgery (<1month)   Varicose veins   History of IBD   Swollen legs (current)   Obesity (BMI >30)   Acute Myocardial infarction   Congestive heart failure (<1 month)   Abnormal pulmonary function (COPD)   On bed rest  Total:   Each risk factor represents 2 points   Age 60-74   Arthroscopic surgery   Malignancy (present or  previous)   Major surgery (>45 min)   Laparoscopic surgery > 45 min   Patient confined to bed (> 72 hours)   Immobilizing plaster cast (< 1 month)   Central venous access      Total: 2  Each Risk factor represents 3 points:   Age > 75    History of DVT/PE   Family history of DVT/PE   Present Chemotherapy    Positive Factor V Leiden   Positive Prothrombin   Elevated homocysteine   Positive lupus anticoagulant   Elevated anticardiolipin antibodies   Heparin induces Thrombocytopenia (HIT)      Total:     For women (each represents 1 point)   Oral contraceptives or  "hormone replacement therapy   Pregnancy or postpartum (<1 month)   History of spontaneous  (>3)      Total:  Caprini Risk Category     Total score Category    0-4 Low   5-8 Moderate   >9 High     Each Risk Factor represents 5 points   Major surgery (> 6 hours)   Hip, pelvis or leg fracture (<1 month)    Stroke (< 1 month)   Multiple trauma (< 1 month)   Acute spinal cord injury or paralysis (<1 month)    Total:         Objective    /74 (BP Location: Left arm, Patient Position: Sitting, Cuff Size: Adult)   Pulse 82   Ht 165.1 cm (65\")   Wt 76.6 kg (168 lb 12.8 oz)   SpO2 96%   BMI 28.09 kg/m²     Body mass index is 28.09 kg/m².    Physical Exam    General: pleasant woman in NAD  CV: RR  Lungs: CTAB, no wheezing  Breast exam: Bilateral large breasts with grade 3 ptosis with right breast slightly larger than the left, left nipple-areolar complex is larger and in higher position compared to the right, overall reasonable symmetry.  Some moisture under both breast, but no active rash. Bilateral breast with soft breast tissue, no concerning masses, skin retraction or nipple drainage.  Extensive striae , bilateral lateral chest soft tissue rolls   SN-N: R 35 cm, L 34.5 cm  IMF-N: R 13 cm, L 12 cm   Breast width: R 19 cm, L 18 cm   N-N: 19 cm  NAC: R 6.5 x 9 cm, L 8 x 8 cm  Chest circumference at IMF level: 39.5  inches  Lymph: No cervical, supraclavicular or axillary lymphadenopathy  Abdomen: Soft, nontender, nondistended, excess skin above and below umbilicus with large pannus hanging over mons pubis, mons ptosis noted, excess skin in vertical and horizontal direction, but more so in the vertical direction, extensive striae, no active rash on exam, stretched out umbilical stalk covered in a skin fold, several well-healed scars from prior laparoscopic surgery, palpable diastases recti above umbilicus and abdominal fascial weakness below umbilicus, but no palpable hernias, no bilateral lateral flank soft " tissue fullness and minimal excess skin, umbilical vertical scar from prior  with scar tethering, RUQ lap band port palpable and subcutaneous tissue just to the right of the midline  Measurements: at umbilicus level 41 inches, lower abdomen at the level of the pannus 46 inches  Extremities: No peripheral edema, full range of motion  Psych: Appropriate mood and affect  Neuro: Grossly intact    Schnur Scale Score:  Body surface area is 1.84 meters squared.  450 g of tissue.     Result Review:   The following data was reviewed by: Shellie Turpin MD on 2025:    Most recent lab work was in 2024 notable for creatinine of 1.14.  I reviewed several of her prior BMP results noted has shown her creatinine has been above 1 for quite some time.  Patient recently had chest x-ray on 2025 with no concerning findings.  She has not had recent EKG.            Assessment and Plan      Diagnoses and all orders for this visit:    1. Macromastia (Primary)    2. Class 1 obesity without serious comorbidity with body mass index (BMI) of 30.0 to 30.9 in adult, unspecified obesity type  -     Ambulatory Referral to Bariatric Surgery    3. Cervicalgia    4. Bilateral shoulder pain, unspecified chronicity    5. Rash      Patient is a 54 woman with history of morbid obesity status post laparoscopic gastric band  and revision in  and subsequent massive weight loss.  She has been stable at her current weight for over 6 months.     She is here for evaluation for bilateral breast reduction.  Mammogram is up to date and she is due for another 1 in the next few weeks.    On physical exam patient has bilateral large pendulous breasts with grade 3 ptosis.  Both breasts are soft without any concerning palpable masses, skin retraction or nipple drainage.  Right breast is slightly larger than the left.  Overall she has fairly good symmetry.  No concerning lymphadenopathy.     Given patient's symptomatology  patient would benefit from bilateral breast reduction mammoplasty.  Her BSA is 1.8  and per Schnur scale I recommend removing 450 g from each breast to help relieve her symptoms.    I discussed with the patient details and possible complications of the procedure including, but not limited to bleeding, seroma, infection, scar, partial or complete nipple loss, sensation changes which are usually temporary, but could be permanent, delayed wound healing, asymmetry, increased calcifications on mammogram and dog ears.     I will perform SPY angiography and if there are any concerns for NAC perfusion I will plan to preform free nipple graft which means NAC will be permanently insensate.   She is aware any significant weight changes in the future may affect her long-term results.      Plan:  Pre-op photos obtained today  BSA 1.8:  plan to remove 450 g from each breast per Schnurr scale.  Her goal is to pursue panniculectomy first.  And I discussed with her that while some excess tissue was removed at the time of surgery this should not significantly change her weight or BSA and therefore unlikely to significantly change the amount of breast tissue that needs to be removed from each side to meet insurance criteria for breast reduction mammoplasty.  All specimens will be sent to pathology  Genetic testing not indicated   Was recent mammogram was in 2024, she has an upcoming mammogram in the next 2 weeks.pending on time and when we will proceed with surgery she was told recommendation to wait 6 months after breast surgery for next mammogram  I will follow-up on the results of her mammogram once she has a done.  If it is done at an outside facility she was asked to fax us the results.  She would like to pursue panniculectomy first.  Referral has been made to Dr. Rasmussen and Dr. Whitfield's office patient has not heard back from them yet.  We will follow-up on that to make sure she is seeing in the near future for lap band removal  or repositioning.  Most recent lab work is from 12/2024. Pending when we proceed with surgery I will likely repeat full set of labs preoperatively including nicotine test  Closer to surgery I will obtain a preoperative EKG and chest x-ray  She was told she will have to hold Wegovy 2 weeks before and 2 weeks after surgery  Recommendation for weight stability for 6 months prior to body contouring procedures. Any future significant weight fluctuations will affect her long-term results  Chest circumference at [facility] level is 39.5 inches.  Was told she will have soft surgical bra and that she will need to pad her incisions under her breast with ABD pads  We discussed postoperative 6-week recovery period and tentative follow-up plan  She was told about drain use, sleeping in recliner position, no lifting more than 10 pounds for 6 weeks, no strenuous activities for 6 weeks, no significant stretches pulls or pushes with her arms for at least 4 weeks  Follow-up will depend on when we proceed with panniculectomy first          I spent 45 minutes caring for Ronna on this date of service. This time includes time spent by me in the following activities:reviewing tests, obtaining and/or reviewing a separately obtained history, performing a medically appropriate examination and/or evaluation , counseling and educating the patient/family/caregiver, referring and communicating with other health care professionals , and documenting information in the medical record      Follow Up     No follow-ups on file.    Patient was given instructions and counseling regarding her condition. Please see specific information pulled into the AVS if appropriate.     Shellie Turpin MD  05/22/2025

## 2025-05-28 DIAGNOSIS — M25.511 BILATERAL SHOULDER PAIN, UNSPECIFIED CHRONICITY: ICD-10-CM

## 2025-05-28 DIAGNOSIS — M25.512 BILATERAL SHOULDER PAIN, UNSPECIFIED CHRONICITY: ICD-10-CM

## 2025-05-28 DIAGNOSIS — N62 MACROMASTIA: Primary | ICD-10-CM

## 2025-05-28 DIAGNOSIS — R21 RASH: ICD-10-CM

## 2025-05-28 DIAGNOSIS — M54.2 CERVICALGIA: ICD-10-CM

## 2025-05-28 RX ORDER — INDOCYANINE GREEN AND WATER 25 MG
5 KIT INJECTION ONCE
OUTPATIENT
Start: 2025-05-28 | End: 2025-05-28

## 2025-05-28 RX ORDER — SODIUM CHLORIDE 0.9 % (FLUSH) 0.9 %
10 SYRINGE (ML) INJECTION EVERY 12 HOURS SCHEDULED
OUTPATIENT
Start: 2025-05-28

## 2025-05-28 RX ORDER — SODIUM CHLORIDE 9 MG/ML
40 INJECTION, SOLUTION INTRAVENOUS AS NEEDED
OUTPATIENT
Start: 2025-05-28

## 2025-05-28 RX ORDER — SODIUM CHLORIDE 0.9 % (FLUSH) 0.9 %
10 SYRINGE (ML) INJECTION AS NEEDED
OUTPATIENT
Start: 2025-05-28

## 2025-07-05 DIAGNOSIS — E66.3 OVERWEIGHT (BMI 25.0-29.9): ICD-10-CM

## 2025-07-07 RX ORDER — SEMAGLUTIDE 2.4 MG/.75ML
2.4 INJECTION, SOLUTION SUBCUTANEOUS WEEKLY
Qty: 3 ML | Refills: 0 | Status: SHIPPED | OUTPATIENT
Start: 2025-07-07 | End: 2025-07-09 | Stop reason: SDUPTHER

## 2025-07-09 ENCOUNTER — OFFICE VISIT (OUTPATIENT)
Dept: FAMILY MEDICINE CLINIC | Facility: CLINIC | Age: 55
End: 2025-07-09
Payer: COMMERCIAL

## 2025-07-09 VITALS
WEIGHT: 169 LBS | HEIGHT: 65 IN | BODY MASS INDEX: 28.16 KG/M2 | HEART RATE: 81 BPM | DIASTOLIC BLOOD PRESSURE: 74 MMHG | SYSTOLIC BLOOD PRESSURE: 105 MMHG | OXYGEN SATURATION: 100 %

## 2025-07-09 DIAGNOSIS — E66.3 OVERWEIGHT (BMI 25.0-29.9): ICD-10-CM

## 2025-07-09 DIAGNOSIS — N28.9 DECREASED RENAL FUNCTION: Primary | ICD-10-CM

## 2025-07-09 PROCEDURE — 99213 OFFICE O/P EST LOW 20 MIN: CPT | Performed by: NURSE PRACTITIONER

## 2025-07-09 RX ORDER — SEMAGLUTIDE 2.4 MG/.75ML
2.4 INJECTION, SOLUTION SUBCUTANEOUS WEEKLY
Qty: 3 ML | Refills: 1 | Status: SHIPPED | OUTPATIENT
Start: 2025-07-09

## 2025-07-09 NOTE — PROGRESS NOTES
Chief Complaint  Obesity/weight management     SUBJECTIVE  Ronna Bajwa presents to [facility] for five month follow up on Wegovy. Pt states she is tolerating well and maintaining her weight.      Starting weight 24 198 lbs   Last weight 25 168 lbs  Current weight 169     Pt is due for colonoscopy, was scheduled back in May 2025 but had to cancel. Pt will get this rescheduled.     Pap smear completed 25 - normal   Pt reports mammogram done same day 25 and was normal as well.     History of Present Illness  Past Medical History:   Diagnosis Date    Anemia     History of transfusion     Hyperlipidemia     Obesity       Family History   Problem Relation Age of Onset    Diabetes Mother     Heart disease Mother     Alzheimer's disease Father     Parkinsonism Father     Colon cancer Father     Heart disease Father     Colon polyps Father     Cancer Sister     Heart disease Sister     Cancer Maternal Grandmother     Birth defects Son          at birth    Miscarriages / Stillbirths Son     Malig Hyperthermia Neg Hx       Past Surgical History:   Procedure Laterality Date    ABDOMINAL SURGERY  ,     BARIATRIC SURGERY  ,     SECTION      x2    COLONOSCOPY  approx     negative per pt     COLONOSCOPY N/A 2021    Procedure: COLONOSCOPY to cecum with biopsies with hot snare polypectomy;  Surgeon: Abdelrahman Gresham MD;  Location: [facility] ENDOSCOPY;  Service: Gastroenterology;  Laterality: N/A;  Pre: family h/x colon cancer, change in bowel habits  Post: polyp, hemorrhoids    ENDOMETRIAL ABLATION      LAPAROSCOPIC GASTRIC BANDING      and removal, and replacement    LAPAROSCOPY HYSTEROSCOPY ENDOMETRIAL ABLATION      LASIK      TUBAL ABDOMINAL LIGATION      UPPER GASTROINTESTINAL ENDOSCOPY  approx     negative per pt         Current Outpatient Medications:     famotidine (Pepcid) 20 MG tablet, Take 1 tablet by mouth Every  "Night., Disp: 30 tablet, Rfl: 0    Semaglutide-Weight Management (Wegovy) 2.4 MG/0.75ML solution auto-injector, Inject 0.75 mL under the skin into the appropriate area as directed 1 (One) Time Per Week., Disp: 3 mL, Rfl: 1    OBJECTIVE  Vital Signs:   /74   Pulse 81   Ht 165.1 cm (65\")   Wt 76.7 kg (169 lb)   SpO2 100%   BMI 28.12 kg/m²    Estimated body mass index is 28.12 kg/m² as calculated from the following:    Height as of this encounter: 165.1 cm (65\").    Weight as of this encounter: 76.7 kg (169 lb).     Wt Readings from Last 3 Encounters:   07/09/25 76.7 kg (169 lb)   05/22/25 76.6 kg (168 lb 12.8 oz)   05/05/25 75.3 kg (166 lb)     BP Readings from Last 3 Encounters:   07/09/25 105/74   05/22/25 113/74   05/05/25 106/72       Physical Exam  Vitals reviewed.   Constitutional:       Appearance: Normal appearance. She is well-developed.   HENT:      Head: Normocephalic and atraumatic.      Right Ear: External ear normal.      Left Ear: External ear normal.   Eyes:      Conjunctiva/sclera: Conjunctivae normal.      Pupils: Pupils are equal, round, and reactive to light.   Cardiovascular:      Rate and Rhythm: Normal rate and regular rhythm.      Heart sounds: No murmur heard.     No friction rub. No gallop.   Pulmonary:      Effort: Pulmonary effort is normal.      Breath sounds: Normal breath sounds. No wheezing or rhonchi.   Skin:     General: Skin is warm and dry.   Neurological:      Mental Status: She is alert and oriented to person, place, and time.      Cranial Nerves: No cranial nerve deficit.   Psychiatric:         Mood and Affect: Mood and affect normal.         Behavior: Behavior normal.         Thought Content: Thought content normal.         Judgment: Judgment normal.          Result Review    [facility]          12/3/2024    10:09   CMP   Glucose 84    BUN 16    Creatinine 1.14    EGFR 57.0    Sodium 139    Potassium 5.0    Chloride 106    Calcium 9.9    Total Protein 7.3    Albumin 4.4  "   Globulin 2.9    Total Bilirubin 0.4    Alkaline Phosphatase 98    AST (SGOT) 18    ALT (SGPT) 10    Albumin/Globulin Ratio 1.5    BUN/Creatinine Ratio 14.0    Anion Gap 8.3      CBC          12/3/2024    10:09   CBC   WBC 8.22    RBC 4.82    Hemoglobin 14.3    Hematocrit 42.5    MCV 88.2    MCH 29.7    MCHC 33.6    RDW 12.6    Platelets 340      Lipid Panel          12/3/2024    10:09   Lipid Panel   Total Cholesterol 200    Triglycerides 82    HDL Cholesterol 47    VLDL Cholesterol 15    LDL Cholesterol  138    LDL/HDL Ratio 2.91      TSH          12/3/2024    10:09   TSH   TSH 2.020      BMP          12/3/2024    10:09   BMP   BUN 16    Creatinine 1.14    Sodium 139    Potassium 5.0    Chloride 106    CO2 24.7    Calcium 9.9      Most Recent A1C          12/3/2024    10:09   HGBA1C Most Recent   Hemoglobin A1C 5.40        A1C Last 3 Results          12/3/2024    10:09   HGBA1C Last 3 Results   Hemoglobin A1C 5.40        XR Chest 2 View  Result Date: 4/21/2025  Impression: No acute cardiopulmonary findings. Electronically Signed: Wesly Littlejohn MD  4/21/2025 5:33 PM EDT  Workstation ID: [id number]       The above data has been reviewed by ASHLEY Harden 07/09/2025 10:58 EDT.          Patient Care Team:  Lolita Montero APRN as PCP - General (Nurse Practitioner)  Naida Paredes MD as Consulting Physician (Obstetrics and Gynecology)            ASSESSMENT & PLAN    Diagnoses and all orders for this visit:    1. Decreased renal function (Primary)  Comments:  discussed importance of staying well-hydrated  Orders:  -     Basic metabolic panel; Future    2. Overweight (BMI 25.0-29.9)  Assessment & Plan:  Patient continues to tolerate Wegovy well, has not lost any additional weight but is maintaining.  We will continue current medication and follow-up again in 3 months.  Patient to work on diet and exercise    Orders:  -     Semaglutide-Weight Management (Wegovy) 2.4 MG/0.75ML solution auto-injector; Inject  0.75 mL under the skin into the appropriate area as directed 1 (One) Time Per Week.  Dispense: 3 mL; Refill: 1         Tobacco Use: Low Risk  (5/22/2025)    Patient History     Smoking Tobacco Use: Never     Smokeless Tobacco Use: Never     Passive Exposure: Not on file       Follow Up     Return in 3 months (on 10/9/2025), or if symptoms worsen or fail to improve.        Patient was given instructions and counseling regarding her condition or for health maintenance advice. Please see specific information pulled into the AVS if appropriate.   I have reviewed information obtained and documented by others and I have confirmed the accuracy of this documented note.    Lolita Montero APRN

## 2025-07-09 NOTE — ASSESSMENT & PLAN NOTE
Patient continues to tolerate Wegovy well, has not lost any additional weight but is maintaining.  We will continue current medication and follow-up again in 3 months.  Patient to work on diet and exercise

## 2025-08-24 DIAGNOSIS — R05.9 COUGH, UNSPECIFIED TYPE: ICD-10-CM

## 2025-08-25 RX ORDER — FAMOTIDINE 20 MG/1
20 TABLET, FILM COATED ORAL
Qty: 90 TABLET | Refills: 0 | Status: SHIPPED | OUTPATIENT
Start: 2025-08-25

## (undated) DEVICE — THE SINGLE USE ETRAP – POLYP TRAP IS USED FOR SUCTION RETRIEVAL OF ENDOSCOPICALLY REMOVED POLYPS.: Brand: ETRAP

## (undated) DEVICE — SENSR O2 OXIMAX FNGR A/ 18IN NONSTR

## (undated) DEVICE — LN SMPL CO2 SHTRM SD STREAM W/M LUER

## (undated) DEVICE — KT ORCA ORCAPOD DISP STRL

## (undated) DEVICE — CANN O2 ETCO2 FITS ALL CONN CO2 SMPL A/ 7IN DISP LF

## (undated) DEVICE — SINGLE-USE BIOPSY FORCEPS: Brand: RADIAL JAW 4

## (undated) DEVICE — SNAR POLYP SENSATION STDOVL 27 240 BX40

## (undated) DEVICE — ADAPT CLN BIOGUARD AIR/H2O DISP

## (undated) DEVICE — TUBING, SUCTION, 1/4" X 10', STRAIGHT: Brand: MEDLINE